# Patient Record
Sex: MALE | Race: WHITE | Employment: OTHER | URBAN - NONMETROPOLITAN AREA
[De-identification: names, ages, dates, MRNs, and addresses within clinical notes are randomized per-mention and may not be internally consistent; named-entity substitution may affect disease eponyms.]

---

## 2017-03-15 ENCOUNTER — DOCTOR'S OFFICE (OUTPATIENT)
Dept: URBAN - NONMETROPOLITAN AREA CLINIC 1 | Facility: CLINIC | Age: 67
Setting detail: OPHTHALMOLOGY
End: 2017-03-15
Payer: COMMERCIAL

## 2017-03-15 DIAGNOSIS — H35.3131: ICD-10-CM

## 2017-03-15 DIAGNOSIS — H40.003: ICD-10-CM

## 2017-03-15 DIAGNOSIS — H25.13: ICD-10-CM

## 2017-03-15 PROCEDURE — 92083 EXTENDED VISUAL FIELD XM: CPT | Performed by: OPHTHALMOLOGY

## 2017-03-15 PROCEDURE — 92134 CPTRZ OPH DX IMG PST SGM RTA: CPT | Performed by: OPHTHALMOLOGY

## 2017-03-15 PROCEDURE — 92014 COMPRE OPH EXAM EST PT 1/>: CPT | Performed by: OPHTHALMOLOGY

## 2017-03-15 PROCEDURE — 76514 ECHO EXAM OF EYE THICKNESS: CPT | Performed by: OPHTHALMOLOGY

## 2017-03-15 ASSESSMENT — LID POSITION - DERMATOCHALASIS
OD_DERMATOCHALASIS: RUL 1+
OS_DERMATOCHALASIS: LUL 1+

## 2017-03-15 ASSESSMENT — PACHYMETRY
OS_CT_CORRECTION: 0
OD_CT_CORRECTION: 0
OS_CT_UM: 544
OD_CT_UM: 545

## 2017-03-15 ASSESSMENT — REFRACTION_AUTOREFRACTION
OD_AXIS: 43
OS_SPHERE: -4.75
OS_AXIS: 78
OD_SPHERE: -3.75
OD_CYLINDER: -0.50
OS_CYLINDER: -1.00

## 2017-03-15 ASSESSMENT — CONFRONTATIONAL VISUAL FIELD TEST (CVF)
OS_FINDINGS: FULL
OD_FINDINGS: FULL

## 2017-03-15 ASSESSMENT — VISUAL ACUITY
OD_BCVA: CF 1FT
OS_BCVA: 20/25

## 2017-03-15 ASSESSMENT — REFRACTION_CURRENTRX
OS_AXIS: 180
OD_OVR_VA: 20/
OD_CYLINDER: 0.00
OD_AXIS: 180
OS_OVR_VA: 20/
OS_SPHERE: -3.75
OS_OVR_VA: 20/
OD_OVR_VA: 20/
OD_VPRISM_DIRECTION: SV
OS_OVR_VA: 20/
OD_OVR_VA: 20/
OS_VPRISM_DIRECTION: SV
OD_SPHERE: -3.75
OS_CYLINDER: 0.00

## 2017-03-15 ASSESSMENT — REFRACTION_MANIFEST
OS_VA2: 20/
OS_VA2: 20/
OS_VA1: 20/
OD_VA2: 20/
OU_VA: 20/
OS_VA1: 20/
OD_VA3: 20/
OD_VA1: 20/
OS_VA3: 20/
OS_VA3: 20/
OD_VA3: 20/
OU_VA: 20/
OD_VA1: 20/
OD_VA2: 20/
OD_VA1: 20/
OS_VA3: 20/
OU_VA: 20/
OD_VA3: 20/
OD_VA2: 20/
OS_VA2: 20/
OS_VA1: 20/

## 2017-03-15 ASSESSMENT — SPHEQUIV_DERIVED
OS_SPHEQUIV: -5.25
OD_SPHEQUIV: -4

## 2017-03-29 ENCOUNTER — RX ONLY (RX ONLY)
Age: 67
End: 2017-03-29

## 2017-03-29 ENCOUNTER — DOCTOR'S OFFICE (OUTPATIENT)
Dept: URBAN - NONMETROPOLITAN AREA CLINIC 1 | Facility: CLINIC | Age: 67
Setting detail: OPHTHALMOLOGY
End: 2017-03-29
Payer: COMMERCIAL

## 2017-03-29 DIAGNOSIS — H25.13: ICD-10-CM

## 2017-03-29 DIAGNOSIS — H40.003: ICD-10-CM

## 2017-03-29 DIAGNOSIS — H35.3131: ICD-10-CM

## 2017-03-29 PROCEDURE — 92014 COMPRE OPH EXAM EST PT 1/>: CPT | Performed by: OPHTHALMOLOGY

## 2017-03-29 PROCEDURE — 92134 CPTRZ OPH DX IMG PST SGM RTA: CPT | Performed by: OPHTHALMOLOGY

## 2017-03-29 ASSESSMENT — REFRACTION_MANIFEST
OD_VA3: 20/
OS_VA2: 20/
OD_VA2: 20/
OS_VA3: 20/
OS_VA1: 20/
OS_VA3: 20/
OD_VA1: 20/
OD_VA2: 20/
OS_VA1: 20/
OU_VA: 20/
OS_VA3: 20/
OD_VA3: 20/
OD_VA3: 20/
OU_VA: 20/
OU_VA: 20/
OS_VA2: 20/
OD_VA1: 20/
OD_VA1: 20/
OS_VA1: 20/
OD_VA2: 20/
OS_VA2: 20/

## 2017-03-29 ASSESSMENT — REFRACTION_CURRENTRX
OS_OVR_VA: 20/
OD_OVR_VA: 20/
OS_VPRISM_DIRECTION: SV
OD_OVR_VA: 20/
OD_VPRISM_DIRECTION: SV
OD_CYLINDER: 0.00
OS_OVR_VA: 20/
OD_SPHERE: -3.75
OD_OVR_VA: 20/
OS_OVR_VA: 20/
OS_SPHERE: -3.75
OD_AXIS: 180
OS_AXIS: 180
OS_CYLINDER: 0.00

## 2017-03-29 ASSESSMENT — REFRACTION_AUTOREFRACTION
OS_AXIS: 78
OS_SPHERE: -4.75
OD_SPHERE: -3.75
OD_AXIS: 43
OS_CYLINDER: -1.00
OD_CYLINDER: -0.50

## 2017-03-29 ASSESSMENT — CONFRONTATIONAL VISUAL FIELD TEST (CVF)
OD_FINDINGS: FULL
OS_FINDINGS: FULL

## 2017-03-29 ASSESSMENT — LID POSITION - DERMATOCHALASIS
OD_DERMATOCHALASIS: RUL 1+
OS_DERMATOCHALASIS: LUL 1+

## 2017-03-29 ASSESSMENT — SPHEQUIV_DERIVED
OS_SPHEQUIV: -5.25
OD_SPHEQUIV: -4

## 2017-03-29 ASSESSMENT — VISUAL ACUITY
OD_BCVA: CF 1FT
OS_BCVA: 20/20-1

## 2017-04-03 ENCOUNTER — DOCTOR'S OFFICE (OUTPATIENT)
Dept: URBAN - NONMETROPOLITAN AREA CLINIC 1 | Facility: CLINIC | Age: 67
Setting detail: OPHTHALMOLOGY
End: 2017-04-03
Payer: COMMERCIAL

## 2017-04-03 DIAGNOSIS — H25.11: ICD-10-CM

## 2017-04-03 PROCEDURE — 92136 OPHTHALMIC BIOMETRY: CPT | Performed by: OPHTHALMOLOGY

## 2017-04-20 ENCOUNTER — AMBUL SURGICAL CARE (OUTPATIENT)
Dept: URBAN - NONMETROPOLITAN AREA SURGERY 1 | Facility: SURGERY | Age: 67
Setting detail: OPHTHALMOLOGY
End: 2017-04-20
Payer: COMMERCIAL

## 2017-04-20 DIAGNOSIS — H25.11: ICD-10-CM

## 2017-04-20 DIAGNOSIS — H25.041: ICD-10-CM

## 2017-04-20 PROCEDURE — 66984 XCAPSL CTRC RMVL W/O ECP: CPT | Performed by: OPHTHALMOLOGY

## 2017-04-20 PROCEDURE — G8918 PT W/O PREOP ORDER IV AB PRO: HCPCS | Performed by: OPHTHALMOLOGY

## 2017-04-20 PROCEDURE — G8907 PT DOC NO EVENTS ON DISCHARG: HCPCS | Performed by: OPHTHALMOLOGY

## 2017-04-21 ENCOUNTER — DOCTOR'S OFFICE (OUTPATIENT)
Dept: URBAN - NONMETROPOLITAN AREA CLINIC 1 | Facility: CLINIC | Age: 67
Setting detail: OPHTHALMOLOGY
End: 2017-04-21
Payer: COMMERCIAL

## 2017-04-21 ENCOUNTER — RX ONLY (RX ONLY)
Age: 67
End: 2017-04-21

## 2017-04-21 DIAGNOSIS — H25.12: ICD-10-CM

## 2017-04-21 DIAGNOSIS — Z96.1: ICD-10-CM

## 2017-04-21 PROCEDURE — 99024 POSTOP FOLLOW-UP VISIT: CPT | Performed by: OPTOMETRIST

## 2017-04-21 PROCEDURE — CATARACT K CATARACT KIT: Performed by: OPTOMETRIST

## 2017-04-21 ASSESSMENT — REFRACTION_CURRENTRX
OD_OVR_VA: 20/
OD_SPHERE: -3.75
OS_SPHERE: -3.75
OS_OVR_VA: 20/
OS_AXIS: 180
OD_OVR_VA: 20/
OS_VPRISM_DIRECTION: SV
OD_OVR_VA: 20/
OD_VPRISM_DIRECTION: SV
OS_CYLINDER: 0.00
OS_OVR_VA: 20/
OD_CYLINDER: 0.00
OD_AXIS: 180
OS_OVR_VA: 20/

## 2017-04-21 ASSESSMENT — REFRACTION_MANIFEST
OS_VA2: 20/
OU_VA: 20/
OS_VA3: 20/
OD_VA2: 20/
OS_VA3: 20/
OS_VA2: 20/
OS_VA1: 20/
OS_VA1: 20/
OD_VA3: 20/
OD_VA1: 20/
OD_VA2: 20/
OD_VA3: 20/
OS_VA3: 20/
OD_VA3: 20/
OD_VA1: 20/
OD_VA1: 20/
OU_VA: 20/
OS_VA2: 20/
OU_VA: 20/
OS_VA1: 20/
OD_VA2: 20/

## 2017-04-21 ASSESSMENT — LID POSITION - DERMATOCHALASIS
OD_DERMATOCHALASIS: RUL 1+
OS_DERMATOCHALASIS: LUL 1+

## 2017-04-21 ASSESSMENT — REFRACTION_AUTOREFRACTION
OS_AXIS: 78
OS_CYLINDER: -1.00
OS_SPHERE: -4.75
OD_CYLINDER: -0.25
OD_SPHERE: -0.75
OD_AXIS: 054

## 2017-04-21 ASSESSMENT — SPHEQUIV_DERIVED
OS_SPHEQUIV: -5.25
OD_SPHEQUIV: -0.875

## 2017-04-21 ASSESSMENT — VISUAL ACUITY
OS_BCVA: 20/40
OD_BCVA: CF 1FT

## 2017-05-03 ENCOUNTER — DOCTOR'S OFFICE (OUTPATIENT)
Dept: URBAN - NONMETROPOLITAN AREA CLINIC 1 | Facility: CLINIC | Age: 67
Setting detail: OPHTHALMOLOGY
End: 2017-05-03
Payer: COMMERCIAL

## 2017-05-03 ENCOUNTER — RX ONLY (RX ONLY)
Age: 67
End: 2017-05-03

## 2017-05-03 DIAGNOSIS — Z96.1: ICD-10-CM

## 2017-05-03 DIAGNOSIS — H25.12: ICD-10-CM

## 2017-05-03 PROCEDURE — 99024 POSTOP FOLLOW-UP VISIT: CPT | Performed by: OPHTHALMOLOGY

## 2017-05-03 PROCEDURE — 92136 OPHTHALMIC BIOMETRY: CPT | Performed by: OPHTHALMOLOGY

## 2017-05-03 ASSESSMENT — REFRACTION_CURRENTRX
OS_OVR_VA: 20/
OS_CYLINDER: 0.00
OS_OVR_VA: 20/
OS_AXIS: 180
OD_AXIS: 180
OS_VPRISM_DIRECTION: SV
OD_OVR_VA: 20/
OS_OVR_VA: 20/
OD_CYLINDER: 0.00
OD_OVR_VA: 20/
OS_SPHERE: -3.75
OD_OVR_VA: 20/
OD_VPRISM_DIRECTION: SV
OD_SPHERE: -3.75

## 2017-05-03 ASSESSMENT — REFRACTION_MANIFEST
OS_VA2: 20/
OS_VA3: 20/
OS_VA2: 20/
OU_VA: 20/
OS_VA3: 20/
OD_VA2: 20/
OD_VA3: 20/
OS_VA1: 20/
OU_VA: 20/
OD_VA1: 20/
OD_VA3: 20/
OD_VA1: 20/
OD_VA2: 20/
OD_VA3: 20/
OS_VA3: 20/
OD_VA2: 20/
OU_VA: 20/
OS_VA2: 20/
OD_VA1: 20/

## 2017-05-03 ASSESSMENT — REFRACTION_AUTOREFRACTION
OD_CYLINDER: -0.75
OS_CYLINDER: -1.50
OS_AXIS: 80
OD_AXIS: 074
OS_SPHERE: -4.50
OD_SPHERE: -0.75

## 2017-05-03 ASSESSMENT — SPHEQUIV_DERIVED
OS_SPHEQUIV: -5.25
OD_SPHEQUIV: -1.125

## 2017-05-03 ASSESSMENT — VISUAL ACUITY
OD_BCVA: CF 1FT
OS_BCVA: 20/25

## 2017-05-08 ENCOUNTER — AMBUL SURGICAL CARE (OUTPATIENT)
Dept: URBAN - NONMETROPOLITAN AREA SURGERY 1 | Facility: SURGERY | Age: 67
Setting detail: OPHTHALMOLOGY
End: 2017-05-08
Payer: COMMERCIAL

## 2017-05-08 DIAGNOSIS — H25.12: ICD-10-CM

## 2017-05-08 DIAGNOSIS — H25.042: ICD-10-CM

## 2017-05-08 PROCEDURE — G8918 PT W/O PREOP ORDER IV AB PRO: HCPCS | Performed by: OPHTHALMOLOGY

## 2017-05-08 PROCEDURE — 66984 XCAPSL CTRC RMVL W/O ECP: CPT | Performed by: OPHTHALMOLOGY

## 2017-05-08 PROCEDURE — G8907 PT DOC NO EVENTS ON DISCHARG: HCPCS | Performed by: OPHTHALMOLOGY

## 2017-05-09 ENCOUNTER — DOCTOR'S OFFICE (OUTPATIENT)
Dept: URBAN - NONMETROPOLITAN AREA CLINIC 1 | Facility: CLINIC | Age: 67
Setting detail: OPHTHALMOLOGY
End: 2017-05-09

## 2017-05-09 ENCOUNTER — RX ONLY (RX ONLY)
Age: 67
End: 2017-05-09

## 2017-05-09 ENCOUNTER — DOCTOR'S OFFICE (OUTPATIENT)
Dept: URBAN - NONMETROPOLITAN AREA CLINIC 1 | Facility: CLINIC | Age: 67
Setting detail: OPHTHALMOLOGY
End: 2017-05-09
Payer: COMMERCIAL

## 2017-05-09 DIAGNOSIS — Z96.1: ICD-10-CM

## 2017-05-09 PROBLEM — H25.12 CATARACT NUCLEAR SCLEROSIS; LEFT EYE: Status: RESOLVED | Noted: 2017-04-21 | Resolved: 2017-05-09

## 2017-05-09 PROCEDURE — 99024 POSTOP FOLLOW-UP VISIT: CPT | Performed by: OPHTHALMOLOGY

## 2017-05-17 ASSESSMENT — SPHEQUIV_DERIVED
OD_SPHEQUIV: -0.875
OS_SPHEQUIV: -1.125

## 2017-05-17 ASSESSMENT — REFRACTION_CURRENTRX
OD_CYLINDER: 0.00
OS_AXIS: 180
OD_OVR_VA: 20/
OS_VPRISM_DIRECTION: SV
OD_AXIS: 180
OS_OVR_VA: 20/
OS_OVR_VA: 20/
OS_CYLINDER: 0.00
OD_VPRISM_DIRECTION: SV
OD_OVR_VA: 20/
OS_SPHERE: -3.75
OS_OVR_VA: 20/
OD_OVR_VA: 20/
OD_SPHERE: -3.75

## 2017-05-17 ASSESSMENT — REFRACTION_MANIFEST
OD_VA2: 20/
OD_VA1: 20/
OU_VA: 20/
OU_VA: 20/
OS_VA1: 20/
OS_VA3: 20/
OS_VA2: 20/
OS_VA2: 20/
OS_VA1: 20/
OD_VA2: 20/
OD_VA1: 20/
OD_VA1: 20/
OS_VA3: 20/
OD_VA3: 20/
OD_VA3: 20/
OS_VA2: 20/
OS_VA3: 20/
OD_VA2: 20/
OU_VA: 20/
OD_VA3: 20/
OS_VA1: 20/

## 2017-05-17 ASSESSMENT — VISUAL ACUITY
OD_BCVA: 20/400
OS_BCVA: 20/30

## 2017-05-17 ASSESSMENT — REFRACTION_AUTOREFRACTION
OD_CYLINDER: -0.75
OS_AXIS: 61
OS_CYLINDER: -1.25
OS_SPHERE: -0.50
OD_SPHERE: -0.50
OD_AXIS: 63

## 2017-05-23 ENCOUNTER — DOCTOR'S OFFICE (OUTPATIENT)
Dept: URBAN - NONMETROPOLITAN AREA CLINIC 1 | Facility: CLINIC | Age: 67
Setting detail: OPHTHALMOLOGY
End: 2017-05-23
Payer: COMMERCIAL

## 2017-05-23 DIAGNOSIS — Z96.1: ICD-10-CM

## 2017-05-23 PROCEDURE — 99024 POSTOP FOLLOW-UP VISIT: CPT | Performed by: OPHTHALMOLOGY

## 2017-05-23 ASSESSMENT — REFRACTION_MANIFEST
OD_VA1: 20/
OD_VA3: 20/
OD_VA1: 20/
OD_VA2: 20/
OS_VA2: 20/
OS_VA1: 20/
OS_VA3: 20/
OD_VA1: 20/
OS_VA1: 20/
OS_VA3: 20/
OD_VA2: 20/
OS_VA2: 20/
OS_VA3: 20/
OU_VA: 20/
OD_VA3: 20/
OU_VA: 20/
OU_VA: 20/
OD_VA2: 20/
OD_VA3: 20/
OS_VA2: 20/
OS_VA1: 20/

## 2017-05-23 ASSESSMENT — REFRACTION_CURRENTRX
OD_OVR_VA: 20/
OD_SPHERE: -3.75
OS_AXIS: 180
OD_CYLINDER: 0.00
OD_OVR_VA: 20/
OS_CYLINDER: 0.00
OS_SPHERE: -3.75
OD_AXIS: 180
OS_OVR_VA: 20/
OS_OVR_VA: 20/
OD_OVR_VA: 20/
OS_OVR_VA: 20/
OD_VPRISM_DIRECTION: SV
OS_VPRISM_DIRECTION: SV

## 2017-05-23 ASSESSMENT — CONFRONTATIONAL VISUAL FIELD TEST (CVF)
OS_FINDINGS: FULL
OD_FINDINGS: FULL

## 2017-05-23 ASSESSMENT — REFRACTION_AUTOREFRACTION
OD_AXIS: 062
OD_CYLINDER: -1.25
OS_CYLINDER: -1.50
OS_SPHERE: -0.50
OS_AXIS: 081
OD_SPHERE: -0.50

## 2017-05-23 ASSESSMENT — LID POSITION - DERMATOCHALASIS
OS_DERMATOCHALASIS: LUL 1+
OD_DERMATOCHALASIS: RUL 1+

## 2017-05-23 ASSESSMENT — SPHEQUIV_DERIVED
OS_SPHEQUIV: -1.25
OD_SPHEQUIV: -1.125

## 2017-05-23 ASSESSMENT — VISUAL ACUITY
OD_BCVA: CF 3FT
OS_BCVA: 20/50

## 2017-07-19 ENCOUNTER — RX ONLY (RX ONLY)
Age: 67
End: 2017-07-19

## 2017-07-19 ENCOUNTER — DOCTOR'S OFFICE (OUTPATIENT)
Dept: URBAN - NONMETROPOLITAN AREA CLINIC 1 | Facility: CLINIC | Age: 67
Setting detail: OPHTHALMOLOGY
End: 2017-07-19
Payer: COMMERCIAL

## 2017-07-19 DIAGNOSIS — H35.3134: ICD-10-CM

## 2017-07-19 DIAGNOSIS — H40.003: ICD-10-CM

## 2017-07-19 DIAGNOSIS — H43.811: ICD-10-CM

## 2017-07-19 PROCEDURE — 92134 CPTRZ OPH DX IMG PST SGM RTA: CPT | Performed by: OPHTHALMOLOGY

## 2017-07-19 PROCEDURE — 92014 COMPRE OPH EXAM EST PT 1/>: CPT | Performed by: OPHTHALMOLOGY

## 2017-07-19 ASSESSMENT — REFRACTION_MANIFEST
OS_VA2: 20/
OD_VA3: 20/
OD_VA1: 20/
OS_VA1: 20/
OD_VA2: 20/
OD_VA2: 20/
OD_VA1: 20/
OS_VA2: 20/
OS_VA3: 20/
OS_VA3: 20/
OD_VA1: 20/
OS_VA1: 20/
OS_VA3: 20/
OD_VA2: 20/
OU_VA: 20/
OU_VA: 20/
OD_VA3: 20/
OS_VA1: 20/
OD_VA3: 20/
OU_VA: 20/
OS_VA2: 20/

## 2017-07-19 ASSESSMENT — REFRACTION_CURRENTRX
OS_OVR_VA: 20/
OS_OVR_VA: 20/
OS_SPHERE: -3.75
OD_VPRISM_DIRECTION: SV
OS_VPRISM_DIRECTION: SV
OD_CYLINDER: 0.00
OS_AXIS: 180
OD_OVR_VA: 20/
OS_CYLINDER: 0.00
OD_AXIS: 180
OD_OVR_VA: 20/
OS_OVR_VA: 20/
OD_SPHERE: -3.75
OD_OVR_VA: 20/

## 2017-07-19 ASSESSMENT — CONFRONTATIONAL VISUAL FIELD TEST (CVF)
OS_FINDINGS: FULL
OD_FINDINGS: FULL

## 2017-07-19 ASSESSMENT — LID POSITION - DERMATOCHALASIS
OD_DERMATOCHALASIS: RUL 1+
OS_DERMATOCHALASIS: LUL 1+

## 2017-07-19 ASSESSMENT — REFRACTION_AUTOREFRACTION
OD_SPHERE: -0.50
OS_CYLINDER: -1.50
OD_CYLINDER: -1.25
OS_AXIS: 081
OS_SPHERE: -0.50
OD_AXIS: 062

## 2017-07-19 ASSESSMENT — VISUAL ACUITY
OS_BCVA: 20/25
OD_BCVA: CF 3FT

## 2017-07-19 ASSESSMENT — SPHEQUIV_DERIVED
OS_SPHEQUIV: -1.25
OD_SPHEQUIV: -1.125

## 2017-11-22 ENCOUNTER — DOCTOR'S OFFICE (OUTPATIENT)
Dept: URBAN - NONMETROPOLITAN AREA CLINIC 1 | Facility: CLINIC | Age: 67
Setting detail: OPHTHALMOLOGY
End: 2017-11-22
Payer: COMMERCIAL

## 2017-11-22 DIAGNOSIS — H35.3134: ICD-10-CM

## 2017-11-22 DIAGNOSIS — Z96.1: ICD-10-CM

## 2017-11-22 DIAGNOSIS — H43.811: ICD-10-CM

## 2017-11-22 DIAGNOSIS — H40.003: ICD-10-CM

## 2017-11-22 PROCEDURE — 92014 COMPRE OPH EXAM EST PT 1/>: CPT | Performed by: OPHTHALMOLOGY

## 2017-11-22 PROCEDURE — 92134 CPTRZ OPH DX IMG PST SGM RTA: CPT | Performed by: OPHTHALMOLOGY

## 2017-11-22 PROCEDURE — 92250 FUNDUS PHOTOGRAPHY W/I&R: CPT | Performed by: OPHTHALMOLOGY

## 2017-11-22 ASSESSMENT — VISUAL ACUITY
OS_BCVA: 20/50+2
OD_BCVA: CF 3FT

## 2017-11-22 ASSESSMENT — REFRACTION_AUTOREFRACTION
OS_SPHERE: -1.25
OD_AXIS: 101
OS_CYLINDER: -2.50
OD_CYLINDER: -1.00
OS_AXIS: 083
OD_SPHERE: -0.75

## 2017-11-22 ASSESSMENT — REFRACTION_MANIFEST
OS_VA2: 20/
OS_VA1: 20/
OD_VA1: 20/
OD_VA1: 20/
OD_VA3: 20/
OS_VA3: 20/
OS_VA2: 20/
OU_VA: 20/
OD_VA2: 20/
OS_VA1: 20/
OD_VA3: 20/
OD_VA1: 20/
OD_VA3: 20/
OD_VA2: 20/
OS_VA3: 20/
OS_VA2: 20/
OS_VA3: 20/
OU_VA: 20/
OS_VA1: 20/
OD_VA2: 20/
OU_VA: 20/

## 2017-11-22 ASSESSMENT — REFRACTION_CURRENTRX
OS_OVR_VA: 20/
OS_CYLINDER: 0.00
OS_OVR_VA: 20/
OD_VPRISM_DIRECTION: SV
OD_SPHERE: -3.75
OS_AXIS: 180
OD_CYLINDER: 0.00
OS_SPHERE: -3.75
OD_OVR_VA: 20/
OD_OVR_VA: 20/
OD_AXIS: 180
OD_OVR_VA: 20/
OS_OVR_VA: 20/
OS_VPRISM_DIRECTION: SV

## 2017-11-22 ASSESSMENT — LID POSITION - DERMATOCHALASIS
OS_DERMATOCHALASIS: LUL 1+
OD_DERMATOCHALASIS: RUL 1+

## 2017-11-22 ASSESSMENT — CONFRONTATIONAL VISUAL FIELD TEST (CVF)
OS_FINDINGS: FULL
OD_FINDINGS: FULL

## 2017-11-22 ASSESSMENT — SPHEQUIV_DERIVED
OD_SPHEQUIV: -1.25
OS_SPHEQUIV: -2.5

## 2017-12-07 ENCOUNTER — DOCTOR'S OFFICE (OUTPATIENT)
Dept: URBAN - METROPOLITAN AREA CLINIC 136 | Facility: CLINIC | Age: 67
Setting detail: OPHTHALMOLOGY
End: 2017-12-07
Payer: COMMERCIAL

## 2017-12-07 DIAGNOSIS — H43.811: ICD-10-CM

## 2017-12-07 DIAGNOSIS — H40.003: ICD-10-CM

## 2017-12-07 DIAGNOSIS — Z96.1: ICD-10-CM

## 2017-12-07 DIAGNOSIS — H35.3211: ICD-10-CM

## 2017-12-07 PROCEDURE — 67028 INJECTION EYE DRUG: CPT | Performed by: OPHTHALMOLOGY

## 2017-12-07 PROCEDURE — 92134 CPTRZ OPH DX IMG PST SGM RTA: CPT | Performed by: OPHTHALMOLOGY

## 2017-12-07 PROCEDURE — 92014 COMPRE OPH EXAM EST PT 1/>: CPT | Performed by: OPHTHALMOLOGY

## 2017-12-07 PROCEDURE — 92235 FLUORESCEIN ANGRPH MLTIFRAME: CPT | Performed by: OPHTHALMOLOGY

## 2017-12-07 ASSESSMENT — LID POSITION - DERMATOCHALASIS
OD_DERMATOCHALASIS: RUL 1+
OS_DERMATOCHALASIS: LUL 1+

## 2017-12-07 ASSESSMENT — CONFRONTATIONAL VISUAL FIELD TEST (CVF)
OS_FINDINGS: FULL
OD_FINDINGS: FULL

## 2017-12-11 ASSESSMENT — REFRACTION_CURRENTRX
OD_OVR_VA: 20/
OS_SPHERE: -3.75
OS_CYLINDER: 0.00
OD_SPHERE: -3.75
OD_AXIS: 180
OS_VPRISM_DIRECTION: SV
OD_OVR_VA: 20/
OS_OVR_VA: 20/
OD_CYLINDER: 0.00
OD_VPRISM_DIRECTION: SV
OS_AXIS: 180
OS_OVR_VA: 20/
OS_OVR_VA: 20/
OD_OVR_VA: 20/

## 2017-12-11 ASSESSMENT — REFRACTION_MANIFEST
OS_VA3: 20/
OU_VA: 20/
OD_VA3: 20/
OS_VA3: 20/
OD_VA3: 20/
OS_VA1: 20/
OS_VA2: 20/
OS_VA2: 20/
OD_VA1: 20/
OD_VA2: 20/
OU_VA: 20/
OD_VA1: 20/
OS_VA1: 20/
OS_VA3: 20/
OD_VA2: 20/
OD_VA2: 20/
OU_VA: 20/
OS_VA1: 20/
OD_VA3: 20/
OS_VA2: 20/
OD_VA1: 20/

## 2017-12-11 ASSESSMENT — REFRACTION_AUTOREFRACTION
OD_CYLINDER: -1.00
OS_SPHERE: -1.25
OD_SPHERE: -0.75
OD_AXIS: 101
OS_AXIS: 083
OS_CYLINDER: -2.50

## 2017-12-11 ASSESSMENT — VISUAL ACUITY
OS_BCVA: 20/20-1
OD_BCVA: CF 3FT

## 2017-12-11 ASSESSMENT — SPHEQUIV_DERIVED
OS_SPHEQUIV: -2.5
OD_SPHEQUIV: -1.25

## 2018-01-18 ENCOUNTER — DOCTOR'S OFFICE (OUTPATIENT)
Dept: URBAN - METROPOLITAN AREA CLINIC 136 | Facility: CLINIC | Age: 68
Setting detail: OPHTHALMOLOGY
End: 2018-01-18
Payer: COMMERCIAL

## 2018-01-18 DIAGNOSIS — Z96.1: ICD-10-CM

## 2018-01-18 DIAGNOSIS — H35.3124: ICD-10-CM

## 2018-01-18 DIAGNOSIS — H40.003: ICD-10-CM

## 2018-01-18 DIAGNOSIS — H35.3211: ICD-10-CM

## 2018-01-18 DIAGNOSIS — H43.811: ICD-10-CM

## 2018-01-18 PROCEDURE — 67028 INJECTION EYE DRUG: CPT | Performed by: OPHTHALMOLOGY

## 2018-01-18 PROCEDURE — 92014 COMPRE OPH EXAM EST PT 1/>: CPT | Performed by: OPHTHALMOLOGY

## 2018-01-18 PROCEDURE — 92134 CPTRZ OPH DX IMG PST SGM RTA: CPT | Performed by: OPHTHALMOLOGY

## 2018-01-18 ASSESSMENT — REFRACTION_MANIFEST
OS_VA2: 20/
OS_VA3: 20/
OD_VA3: 20/
OU_VA: 20/
OS_VA1: 20/
OU_VA: 20/
OU_VA: 20/
OD_VA1: 20/
OS_VA3: 20/
OS_VA2: 20/
OD_VA2: 20/
OD_VA3: 20/
OD_VA1: 20/
OD_VA1: 20/
OS_VA1: 20/
OS_VA3: 20/
OD_VA2: 20/
OS_VA2: 20/
OD_VA2: 20/
OD_VA3: 20/
OS_VA1: 20/

## 2018-01-18 ASSESSMENT — REFRACTION_CURRENTRX
OS_SPHERE: -3.75
OS_CYLINDER: 0.00
OS_OVR_VA: 20/
OD_VPRISM_DIRECTION: SV
OD_CYLINDER: 0.00
OS_VPRISM_DIRECTION: SV
OS_AXIS: 180
OD_AXIS: 180
OD_SPHERE: -3.75
OD_OVR_VA: 20/
OD_OVR_VA: 20/
OS_OVR_VA: 20/
OS_OVR_VA: 20/
OD_OVR_VA: 20/

## 2018-01-18 ASSESSMENT — VISUAL ACUITY
OS_BCVA: 20/25
OD_BCVA: CF 3FT

## 2018-01-18 ASSESSMENT — REFRACTION_AUTOREFRACTION
OD_SPHERE: -0.75
OS_AXIS: 083
OD_CYLINDER: -1.00
OS_CYLINDER: -2.50
OS_SPHERE: -1.25
OD_AXIS: 101

## 2018-01-18 ASSESSMENT — CONFRONTATIONAL VISUAL FIELD TEST (CVF)
OD_FINDINGS: FULL
OS_FINDINGS: FULL

## 2018-01-18 ASSESSMENT — SPHEQUIV_DERIVED
OD_SPHEQUIV: -1.25
OS_SPHEQUIV: -2.5

## 2018-01-18 ASSESSMENT — LID POSITION - DERMATOCHALASIS
OS_DERMATOCHALASIS: LUL 1+
OD_DERMATOCHALASIS: RUL 1+

## 2018-05-02 ENCOUNTER — DOCTOR'S OFFICE (OUTPATIENT)
Dept: URBAN - NONMETROPOLITAN AREA CLINIC 1 | Facility: CLINIC | Age: 68
Setting detail: OPHTHALMOLOGY
End: 2018-05-02
Payer: COMMERCIAL

## 2018-05-02 DIAGNOSIS — H40.003: ICD-10-CM

## 2018-05-02 DIAGNOSIS — H43.811: ICD-10-CM

## 2018-05-02 DIAGNOSIS — H35.3124: ICD-10-CM

## 2018-05-02 DIAGNOSIS — H35.3211: ICD-10-CM

## 2018-05-02 DIAGNOSIS — Z96.1: ICD-10-CM

## 2018-05-02 DIAGNOSIS — H26.493: ICD-10-CM

## 2018-05-02 PROCEDURE — 92083 EXTENDED VISUAL FIELD XM: CPT | Performed by: OPHTHALMOLOGY

## 2018-05-02 PROCEDURE — 92012 INTRM OPH EXAM EST PATIENT: CPT | Performed by: OPHTHALMOLOGY

## 2018-05-02 PROCEDURE — 92134 CPTRZ OPH DX IMG PST SGM RTA: CPT | Performed by: OPHTHALMOLOGY

## 2018-05-02 PROCEDURE — 76514 ECHO EXAM OF EYE THICKNESS: CPT | Performed by: OPHTHALMOLOGY

## 2018-05-02 ASSESSMENT — REFRACTION_MANIFEST
OS_VA3: 20/
OS_VA1: 20/
OD_VA2: 20/
OS_VA2: 20/
OD_VA3: 20/
OS_VA2: 20/
OD_VA1: 20/
OD_VA3: 20/
OS_VA2: 20/
OU_VA: 20/
OD_VA1: 20/
OD_VA3: 20/
OS_VA3: 20/
OD_VA2: 20/
OD_VA2: 20/
OS_VA3: 20/
OU_VA: 20/
OS_VA1: 20/
OD_VA1: 20/
OU_VA: 20/
OS_VA1: 20/

## 2018-05-02 ASSESSMENT — VISUAL ACUITY
OS_BCVA: 20/50+2
OD_BCVA: CF 3FT

## 2018-05-02 ASSESSMENT — REFRACTION_CURRENTRX
OS_AXIS: 180
OS_VPRISM_DIRECTION: SV
OD_OVR_VA: 20/
OS_CYLINDER: 0.00
OD_OVR_VA: 20/
OD_OVR_VA: 20/
OD_VPRISM_DIRECTION: SV
OS_OVR_VA: 20/
OS_OVR_VA: 20/
OD_CYLINDER: 0.00
OD_AXIS: 180
OS_OVR_VA: 20/
OS_SPHERE: -3.75
OD_SPHERE: -3.75

## 2018-05-02 ASSESSMENT — CONFRONTATIONAL VISUAL FIELD TEST (CVF)
OD_FINDINGS: FULL
OS_FINDINGS: FULL

## 2018-05-02 ASSESSMENT — REFRACTION_AUTOREFRACTION
OD_SPHERE: -0.50
OS_CYLINDER: -0.75
OS_AXIS: 98
OD_CYLINDER: -0.75
OD_AXIS: 82
OS_SPHERE: -1.25

## 2018-05-02 ASSESSMENT — LID POSITION - DERMATOCHALASIS
OS_DERMATOCHALASIS: LUL 1+
OD_DERMATOCHALASIS: RUL 1+

## 2018-05-02 ASSESSMENT — SPHEQUIV_DERIVED
OD_SPHEQUIV: -0.875
OS_SPHEQUIV: -1.625

## 2018-05-02 ASSESSMENT — PACHYMETRY
OD_CT_UM: 597
OD_CT_CORRECTION: -4
OS_CT_UM: 598
OS_CT_CORRECTION: -4

## 2018-05-22 ENCOUNTER — AMBUL SURGICAL CARE (OUTPATIENT)
Dept: URBAN - NONMETROPOLITAN AREA SURGERY 1 | Facility: SURGERY | Age: 68
Setting detail: OPHTHALMOLOGY
End: 2018-05-22
Payer: COMMERCIAL

## 2018-05-22 DIAGNOSIS — H26.491: ICD-10-CM

## 2018-05-22 PROCEDURE — 66821 AFTER CATARACT LASER SURGERY: CPT | Performed by: OPHTHALMOLOGY

## 2018-05-22 PROCEDURE — G8918 PT W/O PREOP ORDER IV AB PRO: HCPCS | Performed by: OPHTHALMOLOGY

## 2018-05-22 PROCEDURE — G8907 PT DOC NO EVENTS ON DISCHARG: HCPCS | Performed by: OPHTHALMOLOGY

## 2018-05-29 ENCOUNTER — DOCTOR'S OFFICE (OUTPATIENT)
Dept: URBAN - METROPOLITAN AREA CLINIC 136 | Facility: CLINIC | Age: 68
Setting detail: OPHTHALMOLOGY
End: 2018-05-29
Payer: COMMERCIAL

## 2018-05-29 ENCOUNTER — RX ONLY (RX ONLY)
Age: 68
End: 2018-05-29

## 2018-05-29 DIAGNOSIS — Z96.1: ICD-10-CM

## 2018-05-29 DIAGNOSIS — H26.493: ICD-10-CM

## 2018-05-29 DIAGNOSIS — H43.811: ICD-10-CM

## 2018-05-29 DIAGNOSIS — H40.003: ICD-10-CM

## 2018-05-29 DIAGNOSIS — H35.3124: ICD-10-CM

## 2018-05-29 DIAGNOSIS — H35.3211: ICD-10-CM

## 2018-05-29 DIAGNOSIS — H35.379: ICD-10-CM

## 2018-05-29 PROCEDURE — 92134 CPTRZ OPH DX IMG PST SGM RTA: CPT | Performed by: OPHTHALMOLOGY

## 2018-05-29 PROCEDURE — 92014 COMPRE OPH EXAM EST PT 1/>: CPT | Performed by: OPHTHALMOLOGY

## 2018-05-29 PROCEDURE — 67028 INJECTION EYE DRUG: CPT | Performed by: OPHTHALMOLOGY

## 2018-05-29 ASSESSMENT — REFRACTION_MANIFEST
OS_VA2: 20/
OD_VA3: 20/
OS_VA3: 20/
OS_VA3: 20/
OD_VA2: 20/
OD_VA1: 20/
OU_VA: 20/
OS_VA2: 20/
OS_VA1: 20/
OS_VA2: 20/
OD_VA3: 20/
OS_VA1: 20/
OS_VA1: 20/
OD_VA3: 20/
OU_VA: 20/
OD_VA1: 20/
OD_VA2: 20/
OU_VA: 20/
OD_VA1: 20/
OD_VA2: 20/
OS_VA3: 20/

## 2018-05-29 ASSESSMENT — REFRACTION_CURRENTRX
OD_AXIS: 180
OD_OVR_VA: 20/
OS_VPRISM_DIRECTION: SV
OD_OVR_VA: 20/
OS_OVR_VA: 20/
OD_CYLINDER: 0.00
OS_OVR_VA: 20/
OS_AXIS: 180
OS_CYLINDER: 0.00
OD_OVR_VA: 20/
OD_VPRISM_DIRECTION: SV
OS_SPHERE: -3.75
OD_SPHERE: -3.75
OS_OVR_VA: 20/

## 2018-05-29 ASSESSMENT — REFRACTION_AUTOREFRACTION
OD_SPHERE: -0.50
OD_AXIS: 82
OD_CYLINDER: -0.75
OS_SPHERE: -1.25
OS_CYLINDER: -0.75
OS_AXIS: 98

## 2018-05-29 ASSESSMENT — VISUAL ACUITY
OD_BCVA: CF 3FT
OS_BCVA: 20/25-1

## 2018-05-29 ASSESSMENT — CONFRONTATIONAL VISUAL FIELD TEST (CVF)
OS_FINDINGS: FULL
OD_FINDINGS: FULL

## 2018-05-29 ASSESSMENT — SPHEQUIV_DERIVED
OS_SPHEQUIV: -1.625
OD_SPHEQUIV: -0.875

## 2018-05-29 ASSESSMENT — LID POSITION - DERMATOCHALASIS
OS_DERMATOCHALASIS: LUL 1+
OD_DERMATOCHALASIS: RUL 1+

## 2018-06-12 ENCOUNTER — AMBUL SURGICAL CARE (OUTPATIENT)
Dept: URBAN - NONMETROPOLITAN AREA SURGERY 1 | Facility: SURGERY | Age: 68
Setting detail: OPHTHALMOLOGY
End: 2018-06-12
Payer: COMMERCIAL

## 2018-06-12 ENCOUNTER — DOCTOR'S OFFICE (OUTPATIENT)
Dept: URBAN - NONMETROPOLITAN AREA CLINIC 1 | Facility: CLINIC | Age: 68
Setting detail: OPHTHALMOLOGY
End: 2018-06-12
Payer: COMMERCIAL

## 2018-06-12 DIAGNOSIS — H26.492: ICD-10-CM

## 2018-06-12 PROCEDURE — G8918 PT W/O PREOP ORDER IV AB PRO: HCPCS | Performed by: OPHTHALMOLOGY

## 2018-06-12 PROCEDURE — 66821 AFTER CATARACT LASER SURGERY: CPT | Performed by: OPHTHALMOLOGY

## 2018-06-12 PROCEDURE — G8907 PT DOC NO EVENTS ON DISCHARG: HCPCS | Performed by: OPHTHALMOLOGY

## 2018-06-12 PROCEDURE — 99024 POSTOP FOLLOW-UP VISIT: CPT | Performed by: OPHTHALMOLOGY

## 2018-06-12 ASSESSMENT — REFRACTION_CURRENTRX
OS_VPRISM_DIRECTION: SV
OD_CYLINDER: 0.00
OD_VPRISM_DIRECTION: SV
OS_OVR_VA: 20/
OD_SPHERE: -3.75
OD_AXIS: 180
OD_OVR_VA: 20/
OS_OVR_VA: 20/
OD_OVR_VA: 20/
OS_OVR_VA: 20/
OS_SPHERE: -3.75
OD_OVR_VA: 20/
OS_AXIS: 180
OS_CYLINDER: 0.00

## 2018-06-12 ASSESSMENT — REFRACTION_MANIFEST
OD_VA3: 20/
OD_VA1: 20/
OS_VA3: 20/
OD_VA2: 20/
OU_VA: 20/
OU_VA: 20/
OS_VA2: 20/
OS_VA1: 20/
OD_VA2: 20/
OU_VA: 20/
OD_VA3: 20/
OS_VA1: 20/
OD_VA2: 20/
OD_VA3: 20/
OS_VA1: 20/
OS_VA3: 20/
OS_VA3: 20/
OD_VA1: 20/
OS_VA2: 20/
OD_VA1: 20/
OS_VA2: 20/

## 2018-06-12 ASSESSMENT — VISUAL ACUITY
OD_BCVA: CF 3FT
OS_BCVA: 20/25-1

## 2018-06-12 ASSESSMENT — REFRACTION_AUTOREFRACTION
OD_SPHERE: -0.50
OS_CYLINDER: -0.75
OS_SPHERE: -1.25
OD_AXIS: 82
OS_AXIS: 98
OD_CYLINDER: -0.75

## 2018-06-12 ASSESSMENT — SPHEQUIV_DERIVED
OS_SPHEQUIV: -1.625
OD_SPHEQUIV: -0.875

## 2018-06-27 ENCOUNTER — DOCTOR'S OFFICE (OUTPATIENT)
Dept: URBAN - METROPOLITAN AREA CLINIC 136 | Facility: CLINIC | Age: 68
Setting detail: OPHTHALMOLOGY
End: 2018-06-27
Payer: COMMERCIAL

## 2018-06-27 ENCOUNTER — RX ONLY (RX ONLY)
Age: 68
End: 2018-06-27

## 2018-06-27 DIAGNOSIS — H35.3211: ICD-10-CM

## 2018-06-27 DIAGNOSIS — H40.003: ICD-10-CM

## 2018-06-27 DIAGNOSIS — H35.379: ICD-10-CM

## 2018-06-27 DIAGNOSIS — H43.811: ICD-10-CM

## 2018-06-27 DIAGNOSIS — Z96.1: ICD-10-CM

## 2018-06-27 DIAGNOSIS — H35.3124: ICD-10-CM

## 2018-06-27 PROCEDURE — 92012 INTRM OPH EXAM EST PATIENT: CPT | Performed by: OPHTHALMOLOGY

## 2018-06-27 PROCEDURE — 67028 INJECTION EYE DRUG: CPT | Performed by: OPHTHALMOLOGY

## 2018-06-27 PROCEDURE — 92134 CPTRZ OPH DX IMG PST SGM RTA: CPT | Performed by: OPHTHALMOLOGY

## 2018-06-27 ASSESSMENT — REFRACTION_AUTOREFRACTION
OD_SPHERE: -0.50
OS_SPHERE: -1.25
OD_AXIS: 82
OS_CYLINDER: -0.75
OS_AXIS: 98
OD_CYLINDER: -0.75

## 2018-06-27 ASSESSMENT — REFRACTION_MANIFEST
OU_VA: 20/
OD_VA3: 20/
OD_VA1: 20/
OU_VA: 20/
OD_VA1: 20/
OD_VA2: 20/
OS_VA3: 20/
OS_VA2: 20/
OS_VA1: 20/
OS_VA2: 20/
OD_VA1: 20/
OS_VA1: 20/
OD_VA3: 20/
OS_VA3: 20/
OS_VA1: 20/
OS_VA3: 20/
OD_VA3: 20/
OS_VA2: 20/
OU_VA: 20/
OD_VA2: 20/
OD_VA2: 20/

## 2018-06-27 ASSESSMENT — LID POSITION - DERMATOCHALASIS
OD_DERMATOCHALASIS: RUL 1+
OS_DERMATOCHALASIS: LUL 1+

## 2018-06-27 ASSESSMENT — SPHEQUIV_DERIVED
OD_SPHEQUIV: -0.875
OS_SPHEQUIV: -1.625

## 2018-06-27 ASSESSMENT — CONFRONTATIONAL VISUAL FIELD TEST (CVF)
OS_FINDINGS: FULL
OD_FINDINGS: FULL

## 2018-06-27 ASSESSMENT — REFRACTION_CURRENTRX
OD_VPRISM_DIRECTION: SV
OD_SPHERE: -3.75
OS_CYLINDER: 0.00
OD_AXIS: 180
OS_AXIS: 180
OS_SPHERE: -3.75
OD_OVR_VA: 20/
OS_OVR_VA: 20/
OS_VPRISM_DIRECTION: SV
OD_OVR_VA: 20/
OS_OVR_VA: 20/
OD_OVR_VA: 20/
OS_OVR_VA: 20/
OD_CYLINDER: 0.00

## 2018-06-27 ASSESSMENT — VISUAL ACUITY
OS_BCVA: 20/25-2
OD_BCVA: CF 3FT

## 2018-08-06 ENCOUNTER — RX ONLY (RX ONLY)
Age: 68
End: 2018-08-06

## 2018-08-06 ENCOUNTER — DOCTOR'S OFFICE (OUTPATIENT)
Dept: URBAN - METROPOLITAN AREA CLINIC 136 | Facility: CLINIC | Age: 68
Setting detail: OPHTHALMOLOGY
End: 2018-08-06
Payer: COMMERCIAL

## 2018-08-06 DIAGNOSIS — H40.013: ICD-10-CM

## 2018-08-06 DIAGNOSIS — H43.811: ICD-10-CM

## 2018-08-06 DIAGNOSIS — H35.379: ICD-10-CM

## 2018-08-06 DIAGNOSIS — H35.3124: ICD-10-CM

## 2018-08-06 DIAGNOSIS — H35.3211: ICD-10-CM

## 2018-08-06 DIAGNOSIS — Z96.1: ICD-10-CM

## 2018-08-06 PROCEDURE — 92012 INTRM OPH EXAM EST PATIENT: CPT | Performed by: OPHTHALMOLOGY

## 2018-08-06 PROCEDURE — 92134 CPTRZ OPH DX IMG PST SGM RTA: CPT | Performed by: OPHTHALMOLOGY

## 2018-08-06 PROCEDURE — 67028 INJECTION EYE DRUG: CPT | Performed by: OPHTHALMOLOGY

## 2018-08-06 ASSESSMENT — REFRACTION_MANIFEST
OS_VA2: 20/
OS_VA2: 20/
OD_VA1: 20/
OS_VA1: 20/
OS_VA2: 20/
OD_VA2: 20/
OS_VA3: 20/
OD_VA3: 20/
OD_VA2: 20/
OD_VA3: 20/
OS_VA1: 20/
OD_VA1: 20/
OD_VA1: 20/
OS_VA1: 20/
OU_VA: 20/
OD_VA3: 20/
OS_VA3: 20/
OU_VA: 20/
OD_VA2: 20/
OS_VA3: 20/
OU_VA: 20/

## 2018-08-06 ASSESSMENT — CONFRONTATIONAL VISUAL FIELD TEST (CVF)
OD_FINDINGS: FULL
OS_FINDINGS: FULL

## 2018-08-06 ASSESSMENT — REFRACTION_CURRENTRX
OD_OVR_VA: 20/
OD_SPHERE: -3.75
OS_CYLINDER: 0.00
OD_OVR_VA: 20/
OS_OVR_VA: 20/
OD_CYLINDER: 0.00
OD_VPRISM_DIRECTION: SV
OS_AXIS: 180
OS_OVR_VA: 20/
OS_OVR_VA: 20/
OD_AXIS: 180
OS_SPHERE: -3.75
OS_VPRISM_DIRECTION: SV
OD_OVR_VA: 20/

## 2018-08-06 ASSESSMENT — SPHEQUIV_DERIVED
OD_SPHEQUIV: -0.875
OS_SPHEQUIV: -1.625

## 2018-08-06 ASSESSMENT — REFRACTION_AUTOREFRACTION
OD_SPHERE: -0.50
OD_CYLINDER: -0.75
OS_AXIS: 98
OD_AXIS: 82
OS_SPHERE: -1.25
OS_CYLINDER: -0.75

## 2018-08-06 ASSESSMENT — VISUAL ACUITY
OS_BCVA: 20/25-
OD_BCVA: CF 3FT

## 2018-08-06 ASSESSMENT — LID POSITION - DERMATOCHALASIS
OS_DERMATOCHALASIS: LUL 1+
OD_DERMATOCHALASIS: RUL 1+

## 2018-09-11 ENCOUNTER — DOCTOR'S OFFICE (OUTPATIENT)
Dept: URBAN - METROPOLITAN AREA CLINIC 136 | Facility: CLINIC | Age: 68
Setting detail: OPHTHALMOLOGY
End: 2018-09-11
Payer: COMMERCIAL

## 2018-09-11 DIAGNOSIS — H35.379: ICD-10-CM

## 2018-09-11 DIAGNOSIS — H35.3124: ICD-10-CM

## 2018-09-11 DIAGNOSIS — H35.3211: ICD-10-CM

## 2018-09-11 DIAGNOSIS — H43.811: ICD-10-CM

## 2018-09-11 DIAGNOSIS — H40.013: ICD-10-CM

## 2018-09-11 PROCEDURE — 92014 COMPRE OPH EXAM EST PT 1/>: CPT | Performed by: OPHTHALMOLOGY

## 2018-09-11 PROCEDURE — 92134 CPTRZ OPH DX IMG PST SGM RTA: CPT | Performed by: OPHTHALMOLOGY

## 2018-09-11 PROCEDURE — 67028 INJECTION EYE DRUG: CPT | Performed by: OPHTHALMOLOGY

## 2018-09-11 ASSESSMENT — REFRACTION_MANIFEST
OS_VA3: 20/
OD_VA3: 20/
OD_VA3: 20/
OS_VA2: 20/
OD_VA2: 20/
OD_VA2: 20/
OU_VA: 20/
OS_VA1: 20/
OS_VA3: 20/
OD_VA1: 20/
OD_VA1: 20/
OU_VA: 20/
OS_VA2: 20/
OS_VA1: 20/

## 2018-09-11 ASSESSMENT — SPHEQUIV_DERIVED
OD_SPHEQUIV: -0.875
OS_SPHEQUIV: -1.625

## 2018-09-11 ASSESSMENT — REFRACTION_CURRENTRX
OS_AXIS: 180
OD_CYLINDER: 0.00
OD_OVR_VA: 20/
OS_OVR_VA: 20/
OD_AXIS: 180
OD_SPHERE: -3.75
OS_OVR_VA: 20/
OD_OVR_VA: 20/
OD_VPRISM_DIRECTION: SV
OD_OVR_VA: 20/
OS_VPRISM_DIRECTION: SV
OS_CYLINDER: 0.00
OS_OVR_VA: 20/
OS_SPHERE: -3.75

## 2018-09-11 ASSESSMENT — REFRACTION_AUTOREFRACTION
OS_AXIS: 98
OS_SPHERE: -1.25
OD_CYLINDER: -0.75
OD_SPHERE: -0.50
OS_CYLINDER: -0.75
OD_AXIS: 82

## 2018-09-11 ASSESSMENT — CONFRONTATIONAL VISUAL FIELD TEST (CVF)
OS_FINDINGS: FULL
OD_FINDINGS: FULL

## 2018-09-11 ASSESSMENT — LID POSITION - DERMATOCHALASIS
OS_DERMATOCHALASIS: LUL 1+
OD_DERMATOCHALASIS: RUL 1+

## 2018-09-11 ASSESSMENT — VISUAL ACUITY
OD_BCVA: CF 3FT
OS_BCVA: 20/25-2

## 2018-10-17 ENCOUNTER — DOCTOR'S OFFICE (OUTPATIENT)
Dept: URBAN - METROPOLITAN AREA CLINIC 136 | Facility: CLINIC | Age: 68
Setting detail: OPHTHALMOLOGY
End: 2018-10-17
Payer: COMMERCIAL

## 2018-10-17 DIAGNOSIS — H35.3124: ICD-10-CM

## 2018-10-17 DIAGNOSIS — H43.811: ICD-10-CM

## 2018-10-17 DIAGNOSIS — H35.379: ICD-10-CM

## 2018-10-17 DIAGNOSIS — H35.3211: ICD-10-CM

## 2018-10-17 DIAGNOSIS — H40.013: ICD-10-CM

## 2018-10-17 PROCEDURE — 92012 INTRM OPH EXAM EST PATIENT: CPT | Performed by: OPHTHALMOLOGY

## 2018-10-17 PROCEDURE — 67028 INJECTION EYE DRUG: CPT | Performed by: OPHTHALMOLOGY

## 2018-10-17 PROCEDURE — 92134 CPTRZ OPH DX IMG PST SGM RTA: CPT | Performed by: OPHTHALMOLOGY

## 2018-10-17 ASSESSMENT — LID POSITION - DERMATOCHALASIS
OS_DERMATOCHALASIS: LUL 1+
OD_DERMATOCHALASIS: RUL 1+

## 2018-10-17 ASSESSMENT — CONFRONTATIONAL VISUAL FIELD TEST (CVF)
OD_FINDINGS: FULL
OS_FINDINGS: FULL

## 2018-10-19 ENCOUNTER — RX ONLY (RX ONLY)
Age: 68
End: 2018-10-19

## 2018-10-19 ASSESSMENT — VISUAL ACUITY
OD_BCVA: CF 3FT
OS_BCVA: 20/25-2

## 2018-10-19 ASSESSMENT — REFRACTION_CURRENTRX
OD_CYLINDER: 0.00
OS_OVR_VA: 20/
OS_SPHERE: -3.75
OS_CYLINDER: 0.00
OD_AXIS: 180
OS_OVR_VA: 20/
OD_OVR_VA: 20/
OD_SPHERE: -3.75
OD_OVR_VA: 20/
OS_VPRISM_DIRECTION: SV
OS_OVR_VA: 20/
OD_OVR_VA: 20/
OD_VPRISM_DIRECTION: SV
OS_AXIS: 180

## 2018-10-19 ASSESSMENT — REFRACTION_MANIFEST
OD_VA2: 20/
OS_VA1: 20/
OS_VA2: 20/
OD_VA2: 20/
OU_VA: 20/
OD_VA3: 20/
OU_VA: 20/
OD_VA1: 20/
OS_VA2: 20/
OD_VA1: 20/
OS_VA1: 20/
OD_VA3: 20/
OS_VA3: 20/
OS_VA3: 20/

## 2018-10-19 ASSESSMENT — REFRACTION_AUTOREFRACTION
OD_CYLINDER: -0.75
OD_SPHERE: -0.50
OS_CYLINDER: -0.75
OS_SPHERE: -1.25
OS_AXIS: 98
OD_AXIS: 82

## 2018-10-19 ASSESSMENT — SPHEQUIV_DERIVED
OD_SPHEQUIV: -0.875
OS_SPHEQUIV: -1.625

## 2018-11-29 ENCOUNTER — RX ONLY (RX ONLY)
Age: 68
End: 2018-11-29

## 2018-11-29 ENCOUNTER — DOCTOR'S OFFICE (OUTPATIENT)
Dept: URBAN - METROPOLITAN AREA CLINIC 136 | Facility: CLINIC | Age: 68
Setting detail: OPHTHALMOLOGY
End: 2018-11-29
Payer: COMMERCIAL

## 2018-11-29 DIAGNOSIS — H40.013: ICD-10-CM

## 2018-11-29 DIAGNOSIS — H43.811: ICD-10-CM

## 2018-11-29 DIAGNOSIS — H35.3211: ICD-10-CM

## 2018-11-29 DIAGNOSIS — Z96.1: ICD-10-CM

## 2018-11-29 DIAGNOSIS — H35.379: ICD-10-CM

## 2018-11-29 PROCEDURE — 92134 CPTRZ OPH DX IMG PST SGM RTA: CPT | Performed by: OPHTHALMOLOGY

## 2018-11-29 PROCEDURE — 67028 INJECTION EYE DRUG: CPT | Performed by: OPHTHALMOLOGY

## 2018-11-29 PROCEDURE — 92014 COMPRE OPH EXAM EST PT 1/>: CPT | Performed by: OPHTHALMOLOGY

## 2018-11-29 ASSESSMENT — REFRACTION_CURRENTRX
OD_VPRISM_DIRECTION: SV
OD_CYLINDER: 0.00
OD_AXIS: 180
OS_CYLINDER: 0.00
OS_SPHERE: -3.75
OD_OVR_VA: 20/
OS_OVR_VA: 20/
OD_OVR_VA: 20/
OS_OVR_VA: 20/
OS_AXIS: 180
OD_OVR_VA: 20/
OS_VPRISM_DIRECTION: SV
OS_OVR_VA: 20/
OD_SPHERE: -3.75

## 2018-11-29 ASSESSMENT — REFRACTION_MANIFEST
OS_VA1: 20/
OS_VA2: 20/
OS_VA3: 20/
OD_VA2: 20/
OD_VA1: 20/
OU_VA: 20/
OS_VA1: 20/
OD_VA3: 20/
OD_VA1: 20/
OD_VA2: 20/
OD_VA3: 20/
OU_VA: 20/
OS_VA2: 20/
OS_VA3: 20/

## 2018-11-29 ASSESSMENT — VISUAL ACUITY
OD_BCVA: CF 2FT
OS_BCVA: 20/40-2

## 2018-11-29 ASSESSMENT — REFRACTION_AUTOREFRACTION
OS_AXIS: 98
OS_CYLINDER: -0.75
OD_CYLINDER: -0.75
OS_SPHERE: -1.25
OD_AXIS: 82
OD_SPHERE: -0.50

## 2018-11-29 ASSESSMENT — SPHEQUIV_DERIVED
OS_SPHEQUIV: -1.625
OD_SPHEQUIV: -0.875

## 2018-11-29 ASSESSMENT — LID POSITION - DERMATOCHALASIS
OD_DERMATOCHALASIS: RUL 1+
OS_DERMATOCHALASIS: LUL 1+

## 2018-11-29 ASSESSMENT — CONFRONTATIONAL VISUAL FIELD TEST (CVF)
OS_FINDINGS: FULL
OD_FINDINGS: FULL

## 2018-12-05 ENCOUNTER — DOCTOR'S OFFICE (OUTPATIENT)
Dept: URBAN - NONMETROPOLITAN AREA CLINIC 1 | Facility: CLINIC | Age: 68
Setting detail: OPHTHALMOLOGY
End: 2018-12-05
Payer: COMMERCIAL

## 2018-12-05 DIAGNOSIS — H35.3211: ICD-10-CM

## 2018-12-05 DIAGNOSIS — H43.811: ICD-10-CM

## 2018-12-05 DIAGNOSIS — Z96.1: ICD-10-CM

## 2018-12-05 DIAGNOSIS — H35.3124: ICD-10-CM

## 2018-12-05 DIAGNOSIS — H35.379: ICD-10-CM

## 2018-12-05 DIAGNOSIS — H40.013: ICD-10-CM

## 2018-12-05 PROBLEM — H26.493 POSTERIOR CAPSULE OPACIFIED; BOTH EYES: Status: RESOLVED | Noted: 2018-06-27 | Resolved: 2018-12-05

## 2018-12-05 PROCEDURE — 92012 INTRM OPH EXAM EST PATIENT: CPT | Performed by: OPHTHALMOLOGY

## 2018-12-05 PROCEDURE — 92083 EXTENDED VISUAL FIELD XM: CPT | Performed by: OPHTHALMOLOGY

## 2018-12-05 ASSESSMENT — LID POSITION - DERMATOCHALASIS
OS_DERMATOCHALASIS: LUL 1+
OD_DERMATOCHALASIS: RUL 1+

## 2018-12-05 ASSESSMENT — REFRACTION_CURRENTRX
OS_VPRISM_DIRECTION: SV
OS_AXIS: 180
OD_VPRISM_DIRECTION: SV
OS_OVR_VA: 20/
OS_OVR_VA: 20/
OS_CYLINDER: 0.00
OS_OVR_VA: 20/
OS_SPHERE: -3.75
OD_AXIS: 180
OD_SPHERE: -3.75
OD_OVR_VA: 20/
OD_CYLINDER: 0.00

## 2018-12-05 ASSESSMENT — REFRACTION_MANIFEST
OS_VA2: 20/
OU_VA: 20/
OD_VA2: 20/
OD_VA1: 20/
OS_VA1: 20/
OS_VA3: 20/
OS_VA2: 20/
OD_VA1: 20/
OD_VA2: 20/
OD_VA3: 20/
OD_VA3: 20/
OS_VA1: 20/
OS_VA3: 20/
OU_VA: 20/

## 2018-12-05 ASSESSMENT — REFRACTION_AUTOREFRACTION
OD_AXIS: 069
OS_AXIS: 085
OS_CYLINDER: -1.00
OD_CYLINDER: -1.25
OD_SPHERE: -0.25
OS_SPHERE: -0.75

## 2018-12-05 ASSESSMENT — SPHEQUIV_DERIVED
OS_SPHEQUIV: -1.25
OD_SPHEQUIV: -0.875

## 2018-12-05 ASSESSMENT — VISUAL ACUITY
OD_BCVA: CF 2FT
OS_BCVA: 20/40-2

## 2018-12-27 ENCOUNTER — DOCTOR'S OFFICE (OUTPATIENT)
Dept: URBAN - METROPOLITAN AREA CLINIC 136 | Facility: CLINIC | Age: 68
Setting detail: OPHTHALMOLOGY
End: 2018-12-27
Payer: COMMERCIAL

## 2018-12-27 DIAGNOSIS — H35.3124: ICD-10-CM

## 2018-12-27 DIAGNOSIS — H35.379: ICD-10-CM

## 2018-12-27 DIAGNOSIS — H35.3211: ICD-10-CM

## 2018-12-27 DIAGNOSIS — H43.811: ICD-10-CM

## 2018-12-27 PROCEDURE — 92012 INTRM OPH EXAM EST PATIENT: CPT | Performed by: OPHTHALMOLOGY

## 2018-12-27 PROCEDURE — 67028 INJECTION EYE DRUG: CPT | Performed by: OPHTHALMOLOGY

## 2018-12-27 PROCEDURE — 92134 CPTRZ OPH DX IMG PST SGM RTA: CPT | Performed by: OPHTHALMOLOGY

## 2018-12-27 ASSESSMENT — LID POSITION - DERMATOCHALASIS
OS_DERMATOCHALASIS: LUL 1+
OD_DERMATOCHALASIS: RUL 1+

## 2018-12-27 ASSESSMENT — CONFRONTATIONAL VISUAL FIELD TEST (CVF)
OD_FINDINGS: FULL
OS_FINDINGS: FULL

## 2018-12-29 ENCOUNTER — RX ONLY (RX ONLY)
Age: 68
End: 2018-12-29

## 2018-12-29 ASSESSMENT — REFRACTION_MANIFEST
OU_VA: 20/
OD_VA3: 20/
OD_VA3: 20/
OS_VA3: 20/
OS_VA2: 20/
OD_VA1: 20/
OD_VA1: 20/
OD_VA2: 20/
OS_VA1: 20/
OS_VA2: 20/
OU_VA: 20/
OD_VA2: 20/
OS_VA1: 20/
OS_VA3: 20/

## 2018-12-29 ASSESSMENT — SPHEQUIV_DERIVED
OD_SPHEQUIV: -0.875
OS_SPHEQUIV: -1.25

## 2018-12-29 ASSESSMENT — REFRACTION_CURRENTRX
OD_VPRISM_DIRECTION: SV
OD_OVR_VA: 20/
OD_SPHERE: -3.75
OS_OVR_VA: 20/
OS_CYLINDER: 0.00
OD_AXIS: 180
OS_SPHERE: -3.75
OD_OVR_VA: 20/
OS_AXIS: 180
OS_OVR_VA: 20/
OD_OVR_VA: 20/
OD_CYLINDER: 0.00
OS_OVR_VA: 20/
OS_VPRISM_DIRECTION: SV

## 2018-12-29 ASSESSMENT — REFRACTION_AUTOREFRACTION
OS_SPHERE: -0.75
OD_CYLINDER: -1.25
OD_SPHERE: -0.25
OS_CYLINDER: -1.00
OD_AXIS: 069
OS_AXIS: 085

## 2018-12-29 ASSESSMENT — VISUAL ACUITY
OD_BCVA: CF 2FT
OS_BCVA: 20/25-1

## 2019-01-31 ENCOUNTER — DOCTOR'S OFFICE (OUTPATIENT)
Dept: URBAN - METROPOLITAN AREA CLINIC 136 | Facility: CLINIC | Age: 69
Setting detail: OPHTHALMOLOGY
End: 2019-01-31
Payer: COMMERCIAL

## 2019-01-31 DIAGNOSIS — H35.373: ICD-10-CM

## 2019-01-31 DIAGNOSIS — H35.3124: ICD-10-CM

## 2019-01-31 DIAGNOSIS — H43.811: ICD-10-CM

## 2019-01-31 DIAGNOSIS — H35.3211: ICD-10-CM

## 2019-01-31 PROCEDURE — 92134 CPTRZ OPH DX IMG PST SGM RTA: CPT | Performed by: OPHTHALMOLOGY

## 2019-01-31 PROCEDURE — 67028 INJECTION EYE DRUG: CPT | Performed by: OPHTHALMOLOGY

## 2019-01-31 PROCEDURE — 92014 COMPRE OPH EXAM EST PT 1/>: CPT | Performed by: OPHTHALMOLOGY

## 2019-01-31 ASSESSMENT — REFRACTION_CURRENTRX
OS_OVR_VA: 20/
OS_OVR_VA: 20/
OD_AXIS: 180
OS_OVR_VA: 20/
OD_CYLINDER: 0.00
OS_CYLINDER: 0.00
OD_OVR_VA: 20/
OD_OVR_VA: 20/
OS_SPHERE: -3.75
OD_OVR_VA: 20/
OS_AXIS: 180
OD_SPHERE: -3.75
OS_VPRISM_DIRECTION: SV
OD_VPRISM_DIRECTION: SV

## 2019-01-31 ASSESSMENT — REFRACTION_MANIFEST
OD_VA3: 20/
OS_VA2: 20/
OD_VA1: 20/
OD_VA2: 20/
OD_VA2: 20/
OU_VA: 20/
OD_VA3: 20/
OU_VA: 20/
OS_VA1: 20/
OS_VA1: 20/
OS_VA2: 20/
OD_VA1: 20/
OS_VA3: 20/
OS_VA3: 20/

## 2019-01-31 ASSESSMENT — VISUAL ACUITY
OD_BCVA: CF 2FT
OS_BCVA: 20/30+2

## 2019-01-31 ASSESSMENT — REFRACTION_AUTOREFRACTION
OS_CYLINDER: -1.00
OS_AXIS: 085
OD_SPHERE: -0.25
OD_AXIS: 069
OD_CYLINDER: -1.25
OS_SPHERE: -0.75

## 2019-01-31 ASSESSMENT — SPHEQUIV_DERIVED
OS_SPHEQUIV: -1.25
OD_SPHEQUIV: -0.875

## 2019-01-31 ASSESSMENT — LID POSITION - DERMATOCHALASIS
OS_DERMATOCHALASIS: LUL 1+
OD_DERMATOCHALASIS: RUL 1+

## 2019-01-31 ASSESSMENT — CONFRONTATIONAL VISUAL FIELD TEST (CVF)
OS_FINDINGS: FULL
OD_FINDINGS: FULL

## 2019-03-14 ENCOUNTER — DOCTOR'S OFFICE (OUTPATIENT)
Dept: URBAN - METROPOLITAN AREA CLINIC 136 | Facility: CLINIC | Age: 69
Setting detail: OPHTHALMOLOGY
End: 2019-03-14
Payer: COMMERCIAL

## 2019-03-14 DIAGNOSIS — Z96.1: ICD-10-CM

## 2019-03-14 DIAGNOSIS — H43.811: ICD-10-CM

## 2019-03-14 DIAGNOSIS — H35.3211: ICD-10-CM

## 2019-03-14 DIAGNOSIS — H40.013: ICD-10-CM

## 2019-03-14 DIAGNOSIS — H35.379: ICD-10-CM

## 2019-03-14 DIAGNOSIS — H35.3124: ICD-10-CM

## 2019-03-14 PROCEDURE — 92014 COMPRE OPH EXAM EST PT 1/>: CPT | Performed by: OPHTHALMOLOGY

## 2019-03-14 PROCEDURE — 92134 CPTRZ OPH DX IMG PST SGM RTA: CPT | Performed by: OPHTHALMOLOGY

## 2019-03-14 PROCEDURE — 67028 INJECTION EYE DRUG: CPT | Performed by: OPHTHALMOLOGY

## 2019-03-14 ASSESSMENT — CONFRONTATIONAL VISUAL FIELD TEST (CVF)
OD_FINDINGS: FULL
OS_FINDINGS: FULL

## 2019-03-14 ASSESSMENT — REFRACTION_MANIFEST
OD_VA2: 20/
OD_VA3: 20/
OU_VA: 20/
OS_VA1: 20/
OD_VA2: 20/
OD_VA1: 20/
OS_VA3: 20/
OS_VA1: 20/
OS_VA2: 20/
OD_VA3: 20/
OD_VA1: 20/
OS_VA2: 20/
OU_VA: 20/
OS_VA3: 20/

## 2019-03-14 ASSESSMENT — REFRACTION_CURRENTRX
OD_SPHERE: -3.75
OS_VPRISM_DIRECTION: SV
OS_CYLINDER: 0.00
OS_AXIS: 180
OD_CYLINDER: 0.00
OS_OVR_VA: 20/
OS_OVR_VA: 20/
OD_VPRISM_DIRECTION: SV
OD_OVR_VA: 20/
OS_OVR_VA: 20/
OD_OVR_VA: 20/
OD_OVR_VA: 20/
OD_AXIS: 180
OS_SPHERE: -3.75

## 2019-03-14 ASSESSMENT — REFRACTION_AUTOREFRACTION
OS_AXIS: 085
OS_CYLINDER: -1.00
OD_AXIS: 069
OS_SPHERE: -0.75
OD_CYLINDER: -1.25
OD_SPHERE: -0.25

## 2019-03-14 ASSESSMENT — VISUAL ACUITY
OS_BCVA: 20/30+2
OD_BCVA: CF 2FT

## 2019-03-14 ASSESSMENT — SPHEQUIV_DERIVED
OD_SPHEQUIV: -0.875
OS_SPHEQUIV: -1.25

## 2019-03-14 ASSESSMENT — LID POSITION - DERMATOCHALASIS
OD_DERMATOCHALASIS: RUL 1+
OS_DERMATOCHALASIS: LUL 1+

## 2019-04-17 ENCOUNTER — DOCTOR'S OFFICE (OUTPATIENT)
Dept: URBAN - METROPOLITAN AREA CLINIC 136 | Facility: CLINIC | Age: 69
Setting detail: OPHTHALMOLOGY
End: 2019-04-17
Payer: COMMERCIAL

## 2019-04-17 DIAGNOSIS — H35.3124: ICD-10-CM

## 2019-04-17 DIAGNOSIS — H35.373: ICD-10-CM

## 2019-04-17 DIAGNOSIS — H35.3211: ICD-10-CM

## 2019-04-17 DIAGNOSIS — H43.811: ICD-10-CM

## 2019-04-17 PROCEDURE — 67028 INJECTION EYE DRUG: CPT | Performed by: OPHTHALMOLOGY

## 2019-04-17 PROCEDURE — 92014 COMPRE OPH EXAM EST PT 1/>: CPT | Performed by: OPHTHALMOLOGY

## 2019-04-17 PROCEDURE — 92134 CPTRZ OPH DX IMG PST SGM RTA: CPT | Performed by: OPHTHALMOLOGY

## 2019-04-17 ASSESSMENT — REFRACTION_MANIFEST
OD_VA3: 20/
OS_VA2: 20/
OD_VA2: 20/
OD_VA1: 20/
OS_VA1: 20/
OU_VA: 20/
OD_VA2: 20/
OD_VA1: 20/
OS_VA1: 20/
OS_VA3: 20/
OS_VA2: 20/
OD_VA3: 20/
OS_VA3: 20/
OU_VA: 20/

## 2019-04-17 ASSESSMENT — REFRACTION_AUTOREFRACTION
OD_AXIS: 069
OS_AXIS: 085
OD_SPHERE: -0.25
OS_CYLINDER: -1.00
OD_CYLINDER: -1.25
OS_SPHERE: -0.75

## 2019-04-17 ASSESSMENT — CONFRONTATIONAL VISUAL FIELD TEST (CVF)
OS_FINDINGS: FULL
OD_FINDINGS: FULL

## 2019-04-17 ASSESSMENT — SPHEQUIV_DERIVED
OD_SPHEQUIV: -0.875
OS_SPHEQUIV: -1.25

## 2019-04-17 ASSESSMENT — REFRACTION_CURRENTRX
OD_OVR_VA: 20/
OD_OVR_VA: 20/
OS_AXIS: 180
OS_OVR_VA: 20/
OD_AXIS: 180
OD_VPRISM_DIRECTION: SV
OS_OVR_VA: 20/
OS_VPRISM_DIRECTION: SV
OD_OVR_VA: 20/
OD_CYLINDER: 0.00
OS_SPHERE: -3.75
OS_OVR_VA: 20/
OS_CYLINDER: 0.00
OD_SPHERE: -3.75

## 2019-04-17 ASSESSMENT — VISUAL ACUITY
OS_BCVA: 20/30-2
OD_BCVA: CF 2FT

## 2019-04-17 ASSESSMENT — LID POSITION - DERMATOCHALASIS
OD_DERMATOCHALASIS: RUL 1+
OS_DERMATOCHALASIS: LUL 1+

## 2019-05-22 ENCOUNTER — DOCTOR'S OFFICE (OUTPATIENT)
Dept: URBAN - METROPOLITAN AREA CLINIC 136 | Facility: CLINIC | Age: 69
Setting detail: OPHTHALMOLOGY
End: 2019-05-22
Payer: COMMERCIAL

## 2019-05-22 DIAGNOSIS — H35.3211: ICD-10-CM

## 2019-05-22 PROCEDURE — 67028 INJECTION EYE DRUG: CPT | Performed by: OPHTHALMOLOGY

## 2019-05-22 PROCEDURE — 92134 CPTRZ OPH DX IMG PST SGM RTA: CPT | Performed by: OPHTHALMOLOGY

## 2019-05-22 ASSESSMENT — REFRACTION_MANIFEST
OD_VA2: 20/
OS_VA2: 20/
OD_VA3: 20/
OU_VA: 20/
OD_VA2: 20/
OS_VA3: 20/
OD_VA3: 20/
OS_VA1: 20/
OU_VA: 20/
OS_VA3: 20/
OS_VA1: 20/
OS_VA2: 20/
OD_VA1: 20/
OD_VA1: 20/

## 2019-05-22 ASSESSMENT — REFRACTION_CURRENTRX
OS_VPRISM_DIRECTION: SV
OS_OVR_VA: 20/
OD_SPHERE: -3.75
OS_OVR_VA: 20/
OD_AXIS: 180
OD_OVR_VA: 20/
OS_AXIS: 180
OS_SPHERE: -3.75
OD_VPRISM_DIRECTION: SV
OD_OVR_VA: 20/
OS_CYLINDER: 0.00
OD_CYLINDER: 0.00
OD_OVR_VA: 20/
OS_OVR_VA: 20/

## 2019-05-22 ASSESSMENT — VISUAL ACUITY
OS_BCVA: 20/30-2
OD_BCVA: CF 1FT

## 2019-05-22 ASSESSMENT — CONFRONTATIONAL VISUAL FIELD TEST (CVF)
OS_FINDINGS: FULL
OD_FINDINGS: FULL

## 2019-05-22 ASSESSMENT — REFRACTION_AUTOREFRACTION
OD_AXIS: 069
OS_AXIS: 085
OS_CYLINDER: -1.00
OS_SPHERE: -0.75
OD_CYLINDER: -1.25
OD_SPHERE: -0.25

## 2019-05-22 ASSESSMENT — LID POSITION - DERMATOCHALASIS
OD_DERMATOCHALASIS: RUL 1+
OS_DERMATOCHALASIS: LUL 1+

## 2019-05-22 ASSESSMENT — SPHEQUIV_DERIVED
OS_SPHEQUIV: -1.25
OD_SPHEQUIV: -0.875

## 2019-06-11 ENCOUNTER — RX ONLY (RX ONLY)
Age: 69
End: 2019-06-11

## 2019-06-11 ENCOUNTER — DOCTOR'S OFFICE (OUTPATIENT)
Dept: URBAN - NONMETROPOLITAN AREA CLINIC 1 | Facility: CLINIC | Age: 69
Setting detail: OPHTHALMOLOGY
End: 2019-06-11
Payer: COMMERCIAL

## 2019-06-11 DIAGNOSIS — H43.811: ICD-10-CM

## 2019-06-11 DIAGNOSIS — H40.013: ICD-10-CM

## 2019-06-11 DIAGNOSIS — H35.3211: ICD-10-CM

## 2019-06-11 DIAGNOSIS — H35.379: ICD-10-CM

## 2019-06-11 DIAGNOSIS — H35.3124: ICD-10-CM

## 2019-06-11 DIAGNOSIS — Z96.1: ICD-10-CM

## 2019-06-11 PROCEDURE — 76514 ECHO EXAM OF EYE THICKNESS: CPT | Performed by: OPHTHALMOLOGY

## 2019-06-11 PROCEDURE — 92014 COMPRE OPH EXAM EST PT 1/>: CPT | Performed by: OPHTHALMOLOGY

## 2019-06-11 PROCEDURE — 92133 CPTRZD OPH DX IMG PST SGM ON: CPT | Performed by: OPHTHALMOLOGY

## 2019-06-11 ASSESSMENT — REFRACTION_CURRENTRX
OD_OVR_VA: 20/
OD_CYLINDER: 0.00
OS_OVR_VA: 20/
OD_SPHERE: -3.75
OD_OVR_VA: 20/
OS_OVR_VA: 20/
OS_AXIS: 180
OS_SPHERE: -3.75
OS_VPRISM_DIRECTION: SV
OS_CYLINDER: 0.00
OD_OVR_VA: 20/
OD_AXIS: 180
OS_OVR_VA: 20/
OD_VPRISM_DIRECTION: SV

## 2019-06-11 ASSESSMENT — LID POSITION - DERMATOCHALASIS
OD_DERMATOCHALASIS: RUL 1+
OS_DERMATOCHALASIS: LUL 1+

## 2019-06-11 ASSESSMENT — REFRACTION_MANIFEST
OU_VA: 20/
OS_VA2: 20/
OS_VA3: 20/
OS_VA1: 20/
OS_VA3: 20/
OU_VA: 20/
OD_VA1: 20/
OD_VA3: 20/
OD_VA1: 20/
OD_VA2: 20/
OS_VA2: 20/
OD_VA2: 20/
OS_VA1: 20/
OD_VA3: 20/

## 2019-06-11 ASSESSMENT — REFRACTION_AUTOREFRACTION
OD_SPHERE: -0.25
OD_CYLINDER: -0.75
OD_AXIS: 87

## 2019-06-11 ASSESSMENT — PACHYMETRY
OS_CT_CORRECTION: -1
OS_CT_UM: 568
OD_CT_CORRECTION: -1
OD_CT_UM: 558

## 2019-06-11 ASSESSMENT — CONFRONTATIONAL VISUAL FIELD TEST (CVF)
OS_FINDINGS: FULL
OD_FINDINGS: FULL

## 2019-06-11 ASSESSMENT — SPHEQUIV_DERIVED: OD_SPHEQUIV: -0.625

## 2019-06-11 ASSESSMENT — VISUAL ACUITY
OS_BCVA: 20/50+1
OD_BCVA: 20/CF X 2'

## 2019-06-25 ENCOUNTER — DOCTOR'S OFFICE (OUTPATIENT)
Dept: URBAN - METROPOLITAN AREA CLINIC 136 | Facility: CLINIC | Age: 69
Setting detail: OPHTHALMOLOGY
End: 2019-06-25
Payer: COMMERCIAL

## 2019-06-25 DIAGNOSIS — H35.3211: ICD-10-CM

## 2019-06-25 DIAGNOSIS — H35.3124: ICD-10-CM

## 2019-06-25 DIAGNOSIS — H35.379: ICD-10-CM

## 2019-06-25 DIAGNOSIS — H43.811: ICD-10-CM

## 2019-06-25 PROCEDURE — 92134 CPTRZ OPH DX IMG PST SGM RTA: CPT | Performed by: OPHTHALMOLOGY

## 2019-06-25 PROCEDURE — 67028 INJECTION EYE DRUG: CPT | Performed by: OPHTHALMOLOGY

## 2019-06-25 ASSESSMENT — LID POSITION - DERMATOCHALASIS
OS_DERMATOCHALASIS: LUL 1+
OD_DERMATOCHALASIS: RUL 1+

## 2019-06-25 ASSESSMENT — REFRACTION_MANIFEST
OS_VA3: 20/
OS_VA1: 20/
OS_VA2: 20/
OS_VA2: 20/
OS_VA1: 20/
OD_VA1: 20/
OD_VA1: 20/
OU_VA: 20/
OD_VA2: 20/
OU_VA: 20/
OS_VA3: 20/
OD_VA2: 20/
OD_VA3: 20/
OD_VA3: 20/

## 2019-06-25 ASSESSMENT — REFRACTION_CURRENTRX
OS_CYLINDER: 0.00
OS_OVR_VA: 20/
OD_AXIS: 180
OD_OVR_VA: 20/
OS_VPRISM_DIRECTION: SV
OD_SPHERE: -3.75
OD_OVR_VA: 20/
OD_OVR_VA: 20/
OS_SPHERE: -3.75
OS_AXIS: 180
OD_CYLINDER: 0.00
OS_OVR_VA: 20/
OD_VPRISM_DIRECTION: SV
OS_OVR_VA: 20/

## 2019-06-25 ASSESSMENT — REFRACTION_AUTOREFRACTION
OD_CYLINDER: -0.75
OD_AXIS: 87
OD_SPHERE: -0.25

## 2019-06-25 ASSESSMENT — VISUAL ACUITY: OS_BCVA: 20/30-2

## 2019-06-25 ASSESSMENT — CONFRONTATIONAL VISUAL FIELD TEST (CVF)
OD_FINDINGS: FULL
OS_FINDINGS: FULL

## 2019-06-25 ASSESSMENT — SPHEQUIV_DERIVED: OD_SPHEQUIV: -0.625

## 2019-07-23 ENCOUNTER — DOCTOR'S OFFICE (OUTPATIENT)
Dept: URBAN - METROPOLITAN AREA CLINIC 136 | Facility: CLINIC | Age: 69
Setting detail: OPHTHALMOLOGY
End: 2019-07-23
Payer: COMMERCIAL

## 2019-07-23 DIAGNOSIS — H35.3124: ICD-10-CM

## 2019-07-23 DIAGNOSIS — H35.3211: ICD-10-CM

## 2019-07-23 DIAGNOSIS — H35.379: ICD-10-CM

## 2019-07-23 DIAGNOSIS — H43.811: ICD-10-CM

## 2019-07-23 PROCEDURE — 92134 CPTRZ OPH DX IMG PST SGM RTA: CPT | Performed by: OPHTHALMOLOGY

## 2019-07-23 PROCEDURE — 67028 INJECTION EYE DRUG: CPT | Performed by: OPHTHALMOLOGY

## 2019-07-23 ASSESSMENT — REFRACTION_AUTOREFRACTION
OD_CYLINDER: -0.75
OD_SPHERE: -0.25
OD_AXIS: 87

## 2019-07-23 ASSESSMENT — REFRACTION_CURRENTRX
OS_VPRISM_DIRECTION: SV
OS_OVR_VA: 20/
OD_VPRISM_DIRECTION: SV
OD_OVR_VA: 20/
OD_SPHERE: -3.75
OS_OVR_VA: 20/
OS_AXIS: 180
OS_CYLINDER: 0.00
OD_OVR_VA: 20/
OD_CYLINDER: 0.00
OD_OVR_VA: 20/
OS_OVR_VA: 20/
OS_SPHERE: -3.75
OD_AXIS: 180

## 2019-07-23 ASSESSMENT — REFRACTION_MANIFEST
OS_VA2: 20/
OD_VA1: 20/
OU_VA: 20/
OS_VA1: 20/
OU_VA: 20/
OS_VA3: 20/
OD_VA2: 20/
OD_VA3: 20/
OS_VA3: 20/
OS_VA2: 20/
OS_VA1: 20/
OD_VA1: 20/
OD_VA2: 20/
OD_VA3: 20/

## 2019-07-23 ASSESSMENT — CONFRONTATIONAL VISUAL FIELD TEST (CVF)
OD_FINDINGS: FULL
OS_FINDINGS: FULL

## 2019-07-23 ASSESSMENT — SPHEQUIV_DERIVED: OD_SPHEQUIV: -0.625

## 2019-07-23 ASSESSMENT — LID POSITION - DERMATOCHALASIS
OD_DERMATOCHALASIS: RUL 1+
OS_DERMATOCHALASIS: LUL 1+

## 2019-07-23 ASSESSMENT — VISUAL ACUITY: OS_BCVA: 20/40-1

## 2019-08-28 ENCOUNTER — DOCTOR'S OFFICE (OUTPATIENT)
Dept: URBAN - METROPOLITAN AREA CLINIC 136 | Facility: CLINIC | Age: 69
Setting detail: OPHTHALMOLOGY
End: 2019-08-28
Payer: COMMERCIAL

## 2019-08-28 DIAGNOSIS — H35.3211: ICD-10-CM

## 2019-08-28 PROCEDURE — 67028 INJECTION EYE DRUG: CPT | Performed by: OPHTHALMOLOGY

## 2019-08-28 PROCEDURE — 92134 CPTRZ OPH DX IMG PST SGM RTA: CPT | Performed by: OPHTHALMOLOGY

## 2019-08-28 ASSESSMENT — REFRACTION_MANIFEST
OD_VA1: 20/
OS_VA3: 20/
OU_VA: 20/
OU_VA: 20/
OD_VA3: 20/
OD_VA3: 20/
OD_VA2: 20/
OS_VA3: 20/
OD_VA1: 20/
OS_VA2: 20/
OD_VA2: 20/
OS_VA1: 20/
OS_VA1: 20/
OS_VA2: 20/

## 2019-08-28 ASSESSMENT — VISUAL ACUITY
OD_BCVA: 20/CF @ 2'
OS_BCVA: 20/30+2

## 2019-08-28 ASSESSMENT — REFRACTION_CURRENTRX
OD_CYLINDER: 0.00
OS_OVR_VA: 20/
OS_VPRISM_DIRECTION: SV
OD_AXIS: 180
OS_CYLINDER: 0.00
OS_OVR_VA: 20/
OD_OVR_VA: 20/
OD_VPRISM_DIRECTION: SV
OS_OVR_VA: 20/
OD_OVR_VA: 20/
OS_SPHERE: -3.75
OS_AXIS: 180
OD_OVR_VA: 20/
OD_SPHERE: -3.75

## 2019-08-28 ASSESSMENT — REFRACTION_AUTOREFRACTION
OD_AXIS: 87
OD_CYLINDER: -0.75
OD_SPHERE: -0.25

## 2019-08-28 ASSESSMENT — CONFRONTATIONAL VISUAL FIELD TEST (CVF)
OD_FINDINGS: FULL
OS_FINDINGS: FULL

## 2019-08-28 ASSESSMENT — SPHEQUIV_DERIVED: OD_SPHEQUIV: -0.625

## 2019-09-25 ENCOUNTER — DOCTOR'S OFFICE (OUTPATIENT)
Dept: URBAN - METROPOLITAN AREA CLINIC 136 | Facility: CLINIC | Age: 69
Setting detail: OPHTHALMOLOGY
End: 2019-09-25
Payer: COMMERCIAL

## 2019-09-25 DIAGNOSIS — H43.811: ICD-10-CM

## 2019-09-25 DIAGNOSIS — H35.3124: ICD-10-CM

## 2019-09-25 DIAGNOSIS — H35.3211: ICD-10-CM

## 2019-09-25 PROCEDURE — 67028 INJECTION EYE DRUG: CPT | Performed by: OPHTHALMOLOGY

## 2019-09-25 PROCEDURE — 92012 INTRM OPH EXAM EST PATIENT: CPT | Performed by: OPHTHALMOLOGY

## 2019-09-25 PROCEDURE — 92134 CPTRZ OPH DX IMG PST SGM RTA: CPT | Performed by: OPHTHALMOLOGY

## 2019-09-25 ASSESSMENT — SPHEQUIV_DERIVED: OD_SPHEQUIV: -0.625

## 2019-09-25 ASSESSMENT — REFRACTION_MANIFEST
OS_VA3: 20/
OS_VA1: 20/
OD_VA1: 20/
OD_VA2: 20/
OS_VA3: 20/
OD_VA3: 20/
OS_VA2: 20/
OU_VA: 20/
OD_VA2: 20/
OS_VA2: 20/
OU_VA: 20/
OD_VA3: 20/
OD_VA1: 20/
OS_VA1: 20/

## 2019-09-25 ASSESSMENT — REFRACTION_CURRENTRX
OS_OVR_VA: 20/
OS_AXIS: 180
OD_SPHERE: -3.75
OS_OVR_VA: 20/
OS_OVR_VA: 20/
OD_OVR_VA: 20/
OD_OVR_VA: 20/
OD_CYLINDER: 0.00
OD_OVR_VA: 20/
OD_VPRISM_DIRECTION: SV
OS_SPHERE: -3.75
OS_VPRISM_DIRECTION: SV
OS_CYLINDER: 0.00
OD_AXIS: 180

## 2019-09-25 ASSESSMENT — VISUAL ACUITY
OS_BCVA: 20/25-2
OD_BCVA: 20/CF @ 2'

## 2019-09-25 ASSESSMENT — REFRACTION_AUTOREFRACTION
OD_SPHERE: -0.25
OD_CYLINDER: -0.75
OD_AXIS: 87

## 2019-09-25 ASSESSMENT — CONFRONTATIONAL VISUAL FIELD TEST (CVF)
OS_FINDINGS: FULL
OD_FINDINGS: FULL

## 2019-09-25 ASSESSMENT — LID POSITION - DERMATOCHALASIS
OS_DERMATOCHALASIS: LUL 1+
OD_DERMATOCHALASIS: RUL 1+

## 2019-10-23 ENCOUNTER — DOCTOR'S OFFICE (OUTPATIENT)
Dept: URBAN - METROPOLITAN AREA CLINIC 136 | Facility: CLINIC | Age: 69
Setting detail: OPHTHALMOLOGY
End: 2019-10-23
Payer: COMMERCIAL

## 2019-10-23 DIAGNOSIS — H35.3211: ICD-10-CM

## 2019-10-23 PROCEDURE — 67028 INJECTION EYE DRUG: CPT | Performed by: OPHTHALMOLOGY

## 2019-10-23 ASSESSMENT — REFRACTION_AUTOREFRACTION
OD_SPHERE: -0.25
OD_AXIS: 87
OD_CYLINDER: -0.75

## 2019-10-23 ASSESSMENT — SPHEQUIV_DERIVED: OD_SPHEQUIV: -0.625

## 2019-10-23 ASSESSMENT — VISUAL ACUITY
OS_BCVA: 20/25-1
OD_BCVA: 20/CF @ 2'

## 2019-11-27 ENCOUNTER — DOCTOR'S OFFICE (OUTPATIENT)
Dept: URBAN - METROPOLITAN AREA CLINIC 136 | Facility: CLINIC | Age: 69
Setting detail: OPHTHALMOLOGY
End: 2019-11-27
Payer: COMMERCIAL

## 2019-11-27 DIAGNOSIS — H35.3124: ICD-10-CM

## 2019-11-27 DIAGNOSIS — H43.811: ICD-10-CM

## 2019-11-27 DIAGNOSIS — H35.3211: ICD-10-CM

## 2019-11-27 PROCEDURE — 67028 INJECTION EYE DRUG: CPT | Performed by: OPHTHALMOLOGY

## 2019-11-27 PROCEDURE — 92134 CPTRZ OPH DX IMG PST SGM RTA: CPT | Performed by: OPHTHALMOLOGY

## 2019-11-27 PROCEDURE — 92012 INTRM OPH EXAM EST PATIENT: CPT | Performed by: OPHTHALMOLOGY

## 2019-11-27 ASSESSMENT — REFRACTION_CURRENTRX
OS_OVR_VA: 20/
OD_OVR_VA: 20/
OD_OVR_VA: 20/
OS_OVR_VA: 20/
OD_OVR_VA: 20/
OS_OVR_VA: 20/

## 2019-11-27 ASSESSMENT — REFRACTION_MANIFEST
OS_VA3: 20/
OS_VA3: 20/
OD_VA1: 20/
OD_VA2: 20/
OS_VA2: 20/
OU_VA: 20/
OD_VA2: 20/
OS_VA2: 20/
OD_VA1: 20/
OD_VA3: 20/
OS_VA1: 20/
OU_VA: 20/
OS_VA1: 20/
OD_VA3: 20/

## 2019-11-27 ASSESSMENT — LID POSITION - DERMATOCHALASIS
OS_DERMATOCHALASIS: LUL 1+
OD_DERMATOCHALASIS: RUL 1+

## 2019-11-27 ASSESSMENT — CONFRONTATIONAL VISUAL FIELD TEST (CVF)
OS_FINDINGS: FULL
OD_FINDINGS: FULL

## 2019-11-27 ASSESSMENT — SPHEQUIV_DERIVED: OD_SPHEQUIV: -0.625

## 2019-11-27 ASSESSMENT — VISUAL ACUITY
OS_BCVA: 20/25-1
OD_BCVA: 20/CF @ 2'

## 2019-11-27 ASSESSMENT — REFRACTION_AUTOREFRACTION
OD_SPHERE: -0.25
OD_AXIS: 87
OD_CYLINDER: -0.75

## 2019-12-13 ENCOUNTER — RX ONLY (RX ONLY)
Age: 69
End: 2019-12-13

## 2019-12-13 ENCOUNTER — DOCTOR'S OFFICE (OUTPATIENT)
Dept: URBAN - NONMETROPOLITAN AREA CLINIC 1 | Facility: CLINIC | Age: 69
Setting detail: OPHTHALMOLOGY
End: 2019-12-13
Payer: COMMERCIAL

## 2019-12-13 DIAGNOSIS — Z96.1: ICD-10-CM

## 2019-12-13 DIAGNOSIS — H43.811: ICD-10-CM

## 2019-12-13 DIAGNOSIS — H35.3211: ICD-10-CM

## 2019-12-13 DIAGNOSIS — H35.3124: ICD-10-CM

## 2019-12-13 DIAGNOSIS — H40.013: ICD-10-CM

## 2019-12-13 PROCEDURE — 92250 FUNDUS PHOTOGRAPHY W/I&R: CPT | Performed by: OPHTHALMOLOGY

## 2019-12-13 PROCEDURE — 92083 EXTENDED VISUAL FIELD XM: CPT | Performed by: OPHTHALMOLOGY

## 2019-12-13 PROCEDURE — 92014 COMPRE OPH EXAM EST PT 1/>: CPT | Performed by: OPHTHALMOLOGY

## 2019-12-13 ASSESSMENT — REFRACTION_CURRENTRX
OD_OVR_VA: 20/
OS_OVR_VA: 20/
OS_OVR_VA: 20/
OD_OVR_VA: 20/
OS_OVR_VA: 20/
OD_OVR_VA: 20/

## 2019-12-13 ASSESSMENT — REFRACTION_MANIFEST
OS_VA2: 20/
OD_VA3: 20/
OS_VA1: 20/
OU_VA: 20/
OS_VA3: 20/
OS_VA1: 20/
OD_VA3: 20/
OD_VA2: 20/
OD_VA1: 20/
OU_VA: 20/
OS_VA3: 20/
OD_VA1: 20/
OD_VA2: 20/
OS_VA2: 20/

## 2019-12-13 ASSESSMENT — REFRACTION_AUTOREFRACTION
OS_CYLINDER: -1.50
OS_SPHERE: +0.50
OS_AXIS: 44
OD_CYLINDER: -0.75
OD_SPHERE: 0.00
OD_AXIS: 94

## 2019-12-13 ASSESSMENT — LID POSITION - DERMATOCHALASIS
OD_DERMATOCHALASIS: RUL 1+
OS_DERMATOCHALASIS: LUL 1+

## 2019-12-13 ASSESSMENT — CONFRONTATIONAL VISUAL FIELD TEST (CVF)
OS_FINDINGS: FULL
OD_FINDINGS: FULL

## 2019-12-13 ASSESSMENT — VISUAL ACUITY
OD_BCVA: 20/CF @ 2'
OS_BCVA: 20/50-1

## 2019-12-13 ASSESSMENT — SPHEQUIV_DERIVED
OS_SPHEQUIV: -0.25
OD_SPHEQUIV: -0.375

## 2019-12-31 ENCOUNTER — DOCTOR'S OFFICE (OUTPATIENT)
Dept: URBAN - METROPOLITAN AREA CLINIC 136 | Facility: CLINIC | Age: 69
Setting detail: OPHTHALMOLOGY
End: 2019-12-31
Payer: COMMERCIAL

## 2019-12-31 DIAGNOSIS — H35.3211: ICD-10-CM

## 2019-12-31 PROCEDURE — 67028 INJECTION EYE DRUG: CPT | Performed by: OPHTHALMOLOGY

## 2019-12-31 PROCEDURE — 92134 CPTRZ OPH DX IMG PST SGM RTA: CPT | Performed by: OPHTHALMOLOGY

## 2019-12-31 ASSESSMENT — REFRACTION_AUTOREFRACTION
OS_CYLINDER: -1.50
OD_AXIS: 94
OS_AXIS: 44
OD_SPHERE: 0.00
OS_SPHERE: +0.50
OD_CYLINDER: -0.75

## 2019-12-31 ASSESSMENT — CONFRONTATIONAL VISUAL FIELD TEST (CVF)
OD_FINDINGS: FULL
OS_FINDINGS: FULL

## 2019-12-31 ASSESSMENT — SPHEQUIV_DERIVED
OD_SPHEQUIV: -0.375
OS_SPHEQUIV: -0.25

## 2019-12-31 ASSESSMENT — VISUAL ACUITY
OS_BCVA: 20/30-1
OD_BCVA: 20/CF @ 2'

## 2020-01-30 ENCOUNTER — DOCTOR'S OFFICE (OUTPATIENT)
Dept: URBAN - METROPOLITAN AREA CLINIC 136 | Facility: CLINIC | Age: 70
Setting detail: OPHTHALMOLOGY
End: 2020-01-30
Payer: COMMERCIAL

## 2020-01-30 VITALS — HEIGHT: 60 IN

## 2020-01-30 DIAGNOSIS — H35.3211: ICD-10-CM

## 2020-01-30 PROCEDURE — 67028 INJECTION EYE DRUG: CPT | Performed by: OPHTHALMOLOGY

## 2020-01-30 PROCEDURE — 92134 CPTRZ OPH DX IMG PST SGM RTA: CPT | Performed by: OPHTHALMOLOGY

## 2020-01-30 PROCEDURE — 92012 INTRM OPH EXAM EST PATIENT: CPT | Performed by: OPHTHALMOLOGY

## 2020-01-30 ASSESSMENT — SPHEQUIV_DERIVED
OD_SPHEQUIV: -0.375
OS_SPHEQUIV: -0.25

## 2020-01-30 ASSESSMENT — REFRACTION_AUTOREFRACTION
OD_SPHERE: 0.00
OS_CYLINDER: -1.50
OS_AXIS: 44
OS_SPHERE: +0.50
OD_CYLINDER: -0.75
OD_AXIS: 94

## 2020-01-30 ASSESSMENT — LID POSITION - DERMATOCHALASIS
OS_DERMATOCHALASIS: LUL 1+
OD_DERMATOCHALASIS: RUL 1+

## 2020-01-30 ASSESSMENT — VISUAL ACUITY
OS_BCVA: 20/30-1
OD_BCVA: 20/CF@2FT

## 2020-01-30 ASSESSMENT — CONFRONTATIONAL VISUAL FIELD TEST (CVF)
OS_FINDINGS: FULL
OD_FINDINGS: FULL

## 2020-03-04 ENCOUNTER — DOCTOR'S OFFICE (OUTPATIENT)
Dept: URBAN - METROPOLITAN AREA CLINIC 136 | Facility: CLINIC | Age: 70
Setting detail: OPHTHALMOLOGY
End: 2020-03-04
Payer: COMMERCIAL

## 2020-03-04 DIAGNOSIS — H35.3211: ICD-10-CM

## 2020-03-04 PROCEDURE — 67028 INJECTION EYE DRUG: CPT | Performed by: OPHTHALMOLOGY

## 2020-03-04 PROCEDURE — 92134 CPTRZ OPH DX IMG PST SGM RTA: CPT | Performed by: OPHTHALMOLOGY

## 2020-03-04 ASSESSMENT — REFRACTION_AUTOREFRACTION
OS_CYLINDER: -1.50
OD_AXIS: 94
OD_CYLINDER: -0.75
OS_AXIS: 44
OS_SPHERE: +0.50
OD_SPHERE: 0.00

## 2020-03-04 ASSESSMENT — VISUAL ACUITY
OS_BCVA: 20/50-1
OD_BCVA: 20/CF@ 2FT

## 2020-03-04 ASSESSMENT — SPHEQUIV_DERIVED
OD_SPHEQUIV: -0.375
OS_SPHEQUIV: -0.25

## 2020-03-04 ASSESSMENT — CONFRONTATIONAL VISUAL FIELD TEST (CVF)
OS_FINDINGS: FULL
OD_FINDINGS: FULL

## 2020-04-01 ENCOUNTER — DOCTOR'S OFFICE (OUTPATIENT)
Dept: URBAN - METROPOLITAN AREA CLINIC 136 | Facility: CLINIC | Age: 70
Setting detail: OPHTHALMOLOGY
End: 2020-04-01
Payer: COMMERCIAL

## 2020-04-01 VITALS — HEIGHT: 60 IN

## 2020-04-01 DIAGNOSIS — H35.3211: ICD-10-CM

## 2020-04-01 DIAGNOSIS — H35.3213: ICD-10-CM

## 2020-04-01 PROCEDURE — 67028 INJECTION EYE DRUG: CPT | Performed by: OPHTHALMOLOGY

## 2020-04-01 PROCEDURE — 92134 CPTRZ OPH DX IMG PST SGM RTA: CPT | Performed by: OPHTHALMOLOGY

## 2020-04-01 ASSESSMENT — REFRACTION_AUTOREFRACTION
OD_CYLINDER: -0.75
OS_SPHERE: +0.50
OS_AXIS: 44
OD_AXIS: 94
OS_CYLINDER: -1.50
OD_SPHERE: 0.00

## 2020-04-01 ASSESSMENT — SPHEQUIV_DERIVED
OD_SPHEQUIV: -0.375
OS_SPHEQUIV: -0.25

## 2020-04-01 ASSESSMENT — VISUAL ACUITY
OD_BCVA: 20/CF XS 4'
OS_BCVA: 20/25

## 2020-04-01 ASSESSMENT — LID POSITION - DERMATOCHALASIS
OD_DERMATOCHALASIS: RUL 1+
OS_DERMATOCHALASIS: LUL 1+

## 2020-04-29 ENCOUNTER — DOCTOR'S OFFICE (OUTPATIENT)
Dept: URBAN - METROPOLITAN AREA CLINIC 136 | Facility: CLINIC | Age: 70
Setting detail: OPHTHALMOLOGY
End: 2020-04-29
Payer: COMMERCIAL

## 2020-04-29 VITALS — HEIGHT: 60 IN

## 2020-04-29 DIAGNOSIS — H35.3211: ICD-10-CM

## 2020-04-29 PROCEDURE — 92134 CPTRZ OPH DX IMG PST SGM RTA: CPT | Performed by: OPHTHALMOLOGY

## 2020-04-29 PROCEDURE — 67028 INJECTION EYE DRUG: CPT | Performed by: OPHTHALMOLOGY

## 2020-04-29 ASSESSMENT — REFRACTION_AUTOREFRACTION
OD_AXIS: 94
OD_SPHERE: 0.00
OS_SPHERE: +0.50
OS_CYLINDER: -1.50
OD_CYLINDER: -0.75
OS_AXIS: 44

## 2020-04-29 ASSESSMENT — VISUAL ACUITY
OS_BCVA: 20/25
OD_BCVA: 20/CF XS 3'

## 2020-04-29 ASSESSMENT — SPHEQUIV_DERIVED
OD_SPHEQUIV: -0.375
OS_SPHEQUIV: -0.25

## 2020-05-28 ENCOUNTER — DOCTOR'S OFFICE (OUTPATIENT)
Dept: URBAN - METROPOLITAN AREA CLINIC 136 | Facility: CLINIC | Age: 70
Setting detail: OPHTHALMOLOGY
End: 2020-05-28
Payer: COMMERCIAL

## 2020-05-28 DIAGNOSIS — H35.3211: ICD-10-CM

## 2020-05-28 PROCEDURE — 92134 CPTRZ OPH DX IMG PST SGM RTA: CPT | Performed by: OPHTHALMOLOGY

## 2020-05-28 PROCEDURE — 67028 INJECTION EYE DRUG: CPT | Performed by: OPHTHALMOLOGY

## 2020-05-28 ASSESSMENT — REFRACTION_AUTOREFRACTION
OD_AXIS: 94
OS_AXIS: 44
OS_SPHERE: +0.50
OD_SPHERE: 0.00
OS_CYLINDER: -1.50
OD_CYLINDER: -0.75

## 2020-05-28 ASSESSMENT — VISUAL ACUITY
OD_BCVA: 20/CF XS 3'
OS_BCVA: 20/25

## 2020-05-28 ASSESSMENT — SPHEQUIV_DERIVED
OS_SPHEQUIV: -0.25
OD_SPHEQUIV: -0.375

## 2020-06-12 ENCOUNTER — DOCTOR'S OFFICE (OUTPATIENT)
Dept: URBAN - NONMETROPOLITAN AREA CLINIC 1 | Facility: CLINIC | Age: 70
Setting detail: OPHTHALMOLOGY
End: 2020-06-12
Payer: COMMERCIAL

## 2020-06-12 DIAGNOSIS — H40.013: ICD-10-CM

## 2020-06-12 DIAGNOSIS — Z96.1: ICD-10-CM

## 2020-06-12 PROCEDURE — 92133 CPTRZD OPH DX IMG PST SGM ON: CPT | Performed by: OPHTHALMOLOGY

## 2020-06-12 PROCEDURE — 92014 COMPRE OPH EXAM EST PT 1/>: CPT | Performed by: OPHTHALMOLOGY

## 2020-06-12 ASSESSMENT — REFRACTION_AUTOREFRACTION
OS_CYLINDER: -1.50
OD_CYLINDER: -0.50
OS_AXIS: 89
OS_SPHERE: -0.25
OD_AXIS: 73
OD_SPHERE: -0.50

## 2020-06-12 ASSESSMENT — CONFRONTATIONAL VISUAL FIELD TEST (CVF)
OD_FINDINGS: FULL
OS_FINDINGS: FULL

## 2020-06-12 ASSESSMENT — LID POSITION - DERMATOCHALASIS
OD_DERMATOCHALASIS: RUL 1+
OS_DERMATOCHALASIS: LUL 1+

## 2020-06-12 ASSESSMENT — SPHEQUIV_DERIVED
OD_SPHEQUIV: -0.75
OS_SPHEQUIV: -1

## 2020-06-12 ASSESSMENT — VISUAL ACUITY
OD_BCVA: CF 2FT
OS_BCVA: 20/50-1

## 2020-06-30 ENCOUNTER — DOCTOR'S OFFICE (OUTPATIENT)
Dept: URBAN - METROPOLITAN AREA CLINIC 136 | Facility: CLINIC | Age: 70
Setting detail: OPHTHALMOLOGY
End: 2020-06-30
Payer: COMMERCIAL

## 2020-06-30 DIAGNOSIS — H43.811: ICD-10-CM

## 2020-06-30 DIAGNOSIS — H35.379: ICD-10-CM

## 2020-06-30 DIAGNOSIS — H35.3124: ICD-10-CM

## 2020-06-30 DIAGNOSIS — H35.3211: ICD-10-CM

## 2020-06-30 PROCEDURE — 92134 CPTRZ OPH DX IMG PST SGM RTA: CPT | Performed by: OPHTHALMOLOGY

## 2020-06-30 PROCEDURE — 92012 INTRM OPH EXAM EST PATIENT: CPT | Performed by: OPHTHALMOLOGY

## 2020-06-30 PROCEDURE — 67028 INJECTION EYE DRUG: CPT | Performed by: OPHTHALMOLOGY

## 2020-06-30 ASSESSMENT — VISUAL ACUITY
OD_BCVA: CF @ 5FT
OS_BCVA: 20/40-1

## 2020-06-30 ASSESSMENT — CONFRONTATIONAL VISUAL FIELD TEST (CVF)
OS_FINDINGS: FULL
OD_FINDINGS: FULL

## 2020-06-30 ASSESSMENT — REFRACTION_AUTOREFRACTION
OS_AXIS: 89
OS_SPHERE: -0.25
OD_CYLINDER: -0.50
OD_SPHERE: -0.50
OD_AXIS: 73
OS_CYLINDER: -1.50

## 2020-06-30 ASSESSMENT — LID POSITION - DERMATOCHALASIS
OS_DERMATOCHALASIS: LUL 1+
OD_DERMATOCHALASIS: RUL 1+

## 2020-06-30 ASSESSMENT — SPHEQUIV_DERIVED
OS_SPHEQUIV: -1
OD_SPHEQUIV: -0.75

## 2020-07-30 ENCOUNTER — DOCTOR'S OFFICE (OUTPATIENT)
Dept: URBAN - METROPOLITAN AREA CLINIC 136 | Facility: CLINIC | Age: 70
Setting detail: OPHTHALMOLOGY
End: 2020-07-30
Payer: COMMERCIAL

## 2020-07-30 DIAGNOSIS — H35.3211: ICD-10-CM

## 2020-07-30 PROCEDURE — 67028 INJECTION EYE DRUG: CPT | Performed by: OPHTHALMOLOGY

## 2020-07-30 PROCEDURE — 92134 CPTRZ OPH DX IMG PST SGM RTA: CPT | Performed by: OPHTHALMOLOGY

## 2020-07-30 ASSESSMENT — VISUAL ACUITY
OS_BCVA: 20/30-1
OD_BCVA: CF 1FT

## 2020-07-30 ASSESSMENT — SPHEQUIV_DERIVED
OD_SPHEQUIV: -0.75
OS_SPHEQUIV: -1

## 2020-07-30 ASSESSMENT — REFRACTION_AUTOREFRACTION
OS_SPHERE: -0.25
OD_AXIS: 73
OD_CYLINDER: -0.50
OS_CYLINDER: -1.50
OS_AXIS: 89
OD_SPHERE: -0.50

## 2020-08-11 DIAGNOSIS — I25.10 ATHEROSCLEROTIC HEART DISEASE OF NATIVE CORONARY ARTERY WITHOUT ANGINA PECTORIS: ICD-10-CM

## 2020-08-11 DIAGNOSIS — N18.30 CHRONIC KIDNEY DISEASE, STAGE 3 (MODERATE): ICD-10-CM

## 2020-08-11 DIAGNOSIS — I10 ESSENTIAL (PRIMARY) HYPERTENSION: ICD-10-CM

## 2020-08-11 DIAGNOSIS — I12.9 HYPERTENSIVE CHRONIC KIDNEY DISEASE WITH STAGE 1 THROUGH STAGE 4 CHRONIC KIDNEY DISEASE, OR UNSPECIFIED CHRONIC KIDNEY DISEASE: ICD-10-CM

## 2020-08-11 DIAGNOSIS — I77.810 THORACIC AORTIC ECTASIA (HCC): ICD-10-CM

## 2020-08-11 DIAGNOSIS — I25.2 OLD MYOCARDIAL INFARCTION: ICD-10-CM

## 2020-09-02 ENCOUNTER — DOCTOR'S OFFICE (OUTPATIENT)
Dept: URBAN - METROPOLITAN AREA CLINIC 136 | Facility: CLINIC | Age: 70
Setting detail: OPHTHALMOLOGY
End: 2020-09-02
Payer: COMMERCIAL

## 2020-09-02 ENCOUNTER — RX ONLY (RX ONLY)
Age: 70
End: 2020-09-02

## 2020-09-02 VITALS — HEIGHT: 60 IN

## 2020-09-02 DIAGNOSIS — H35.3211: ICD-10-CM

## 2020-09-02 PROCEDURE — 92134 CPTRZ OPH DX IMG PST SGM RTA: CPT | Performed by: OPHTHALMOLOGY

## 2020-09-02 PROCEDURE — 67028 INJECTION EYE DRUG: CPT | Performed by: OPHTHALMOLOGY

## 2020-09-02 ASSESSMENT — CONFRONTATIONAL VISUAL FIELD TEST (CVF)
OD_FINDINGS: FULL
OS_FINDINGS: FULL

## 2020-09-02 ASSESSMENT — LID POSITION - DERMATOCHALASIS
OS_DERMATOCHALASIS: LUL 1+
OD_DERMATOCHALASIS: RUL 1+

## 2020-09-02 ASSESSMENT — REFRACTION_AUTOREFRACTION
OD_CYLINDER: -0.50
OD_AXIS: 73
OD_SPHERE: -0.50
OS_SPHERE: -0.25
OS_AXIS: 89
OS_CYLINDER: -1.50

## 2020-09-02 ASSESSMENT — VISUAL ACUITY
OS_BCVA: 20/30-1
OD_BCVA: 20/CF@3FT

## 2020-09-02 ASSESSMENT — SPHEQUIV_DERIVED
OS_SPHEQUIV: -1
OD_SPHEQUIV: -0.75

## 2020-09-08 ENCOUNTER — HOSPITAL ENCOUNTER (OUTPATIENT)
Dept: CT IMAGING | Facility: HOSPITAL | Age: 70
Discharge: HOME | End: 2020-09-08
Payer: MEDICARE

## 2020-09-08 DIAGNOSIS — I25.10 ATHEROSCLEROTIC HEART DISEASE OF NATIVE CORONARY ARTERY WITHOUT ANGINA PECTORIS: ICD-10-CM

## 2020-09-08 DIAGNOSIS — I10 ESSENTIAL (PRIMARY) HYPERTENSION: ICD-10-CM

## 2020-09-08 DIAGNOSIS — N18.30 CHRONIC KIDNEY DISEASE, STAGE 3 (MODERATE): ICD-10-CM

## 2020-09-08 DIAGNOSIS — I25.2 OLD MYOCARDIAL INFARCTION: ICD-10-CM

## 2020-09-08 DIAGNOSIS — I77.810 THORACIC AORTIC ECTASIA (HCC): ICD-10-CM

## 2020-09-08 DIAGNOSIS — I12.9 HYPERTENSIVE CHRONIC KIDNEY DISEASE WITH STAGE 1 THROUGH STAGE 4 CHRONIC KIDNEY DISEASE, OR UNSPECIFIED CHRONIC KIDNEY DISEASE: ICD-10-CM

## 2020-09-08 PROCEDURE — 71250 CT THORAX DX C-: CPT

## 2020-10-13 ENCOUNTER — RX ONLY (RX ONLY)
Age: 70
End: 2020-10-13

## 2020-10-13 ENCOUNTER — DOCTOR'S OFFICE (OUTPATIENT)
Dept: URBAN - NONMETROPOLITAN AREA CLINIC 1 | Facility: CLINIC | Age: 70
Setting detail: OPHTHALMOLOGY
End: 2020-10-13
Payer: COMMERCIAL

## 2020-10-13 VITALS — HEIGHT: 60 IN

## 2020-10-13 DIAGNOSIS — H35.3211: ICD-10-CM

## 2020-10-13 DIAGNOSIS — H35.379: ICD-10-CM

## 2020-10-13 DIAGNOSIS — H43.811: ICD-10-CM

## 2020-10-13 DIAGNOSIS — H40.013: ICD-10-CM

## 2020-10-13 DIAGNOSIS — H35.3124: ICD-10-CM

## 2020-10-13 PROCEDURE — 92134 CPTRZ OPH DX IMG PST SGM RTA: CPT | Performed by: OPHTHALMOLOGY

## 2020-10-13 PROCEDURE — 92014 COMPRE OPH EXAM EST PT 1/>: CPT | Performed by: OPHTHALMOLOGY

## 2020-10-13 ASSESSMENT — LID POSITION - DERMATOCHALASIS
OD_DERMATOCHALASIS: RUL 1+
OS_DERMATOCHALASIS: LUL 1+

## 2020-10-13 ASSESSMENT — CONFRONTATIONAL VISUAL FIELD TEST (CVF)
OS_FINDINGS: FULL
OD_FINDINGS: FULL

## 2020-10-15 ASSESSMENT — REFRACTION_AUTOREFRACTION
OS_AXIS: -1.26
OD_CYLINDER: -1.00
OD_AXIS: 79
OD_SPHERE: -0.25
OS_SPHERE: -0.75
OS_CYLINDER: -1.50

## 2020-10-15 ASSESSMENT — SPHEQUIV_DERIVED
OS_SPHEQUIV: -1.5
OD_SPHEQUIV: -0.75

## 2020-10-15 ASSESSMENT — KERATOMETRY
OD_K1POWER_DIOPTERS: 41.75
OD_AXISANGLE_DEGREES: 154
OD_K2POWER_DIOPTERS: 42.00
OS_AXISANGLE_DEGREES: 151
OS_K2POWER_DIOPTERS: 42.50
OS_K1POWER_DIOPTERS: 42.25

## 2020-10-15 ASSESSMENT — AXIALLENGTH_DERIVED
OD_AL: 24.5158
OS_AL: 24.6336

## 2020-10-15 ASSESSMENT — VISUAL ACUITY
OD_BCVA: CF@1FT
OS_BCVA: 20/50-1

## 2020-10-15 ASSESSMENT — REFRACTION_CURRENTRX
OD_OVR_VA: 20/
OS_OVR_VA: 20/

## 2020-10-30 ENCOUNTER — DOCTOR'S OFFICE (OUTPATIENT)
Dept: URBAN - NONMETROPOLITAN AREA CLINIC 1 | Facility: CLINIC | Age: 70
Setting detail: OPHTHALMOLOGY
End: 2020-10-30
Payer: COMMERCIAL

## 2020-10-30 DIAGNOSIS — H35.3211: ICD-10-CM

## 2020-10-30 PROCEDURE — SAMPLE SAMPLE MEDICATION: Performed by: OPHTHALMOLOGY

## 2020-10-30 PROCEDURE — 67028 INJECTION EYE DRUG: CPT | Performed by: OPHTHALMOLOGY

## 2020-10-30 ASSESSMENT — AXIALLENGTH_DERIVED
OS_AL: 24.6336
OD_AL: 24.5158

## 2020-10-30 ASSESSMENT — REFRACTION_AUTOREFRACTION
OS_SPHERE: -0.75
OS_AXIS: -1.26
OD_CYLINDER: -1.00
OS_CYLINDER: -1.50
OD_AXIS: 79
OD_SPHERE: -0.25

## 2020-10-30 ASSESSMENT — KERATOMETRY
OS_K1POWER_DIOPTERS: 42.25
OS_K2POWER_DIOPTERS: 42.50
OD_K2POWER_DIOPTERS: 42.00
OD_AXISANGLE_DEGREES: 154
OD_K1POWER_DIOPTERS: 41.75
OS_AXISANGLE_DEGREES: 151

## 2020-10-30 ASSESSMENT — SPHEQUIV_DERIVED
OD_SPHEQUIV: -0.75
OS_SPHEQUIV: -1.5

## 2020-10-30 ASSESSMENT — VISUAL ACUITY
OD_BCVA: 20/CFX1FT
OS_BCVA: 20/50-2

## 2020-12-04 ENCOUNTER — DOCTOR'S OFFICE (OUTPATIENT)
Dept: URBAN - NONMETROPOLITAN AREA CLINIC 1 | Facility: CLINIC | Age: 70
Setting detail: OPHTHALMOLOGY
End: 2020-12-04
Payer: COMMERCIAL

## 2020-12-04 VITALS — HEIGHT: 60 IN

## 2020-12-04 DIAGNOSIS — H35.3211: ICD-10-CM

## 2020-12-04 DIAGNOSIS — H43.811: ICD-10-CM

## 2020-12-04 DIAGNOSIS — H35.379: ICD-10-CM

## 2020-12-04 DIAGNOSIS — Z96.1: ICD-10-CM

## 2020-12-04 DIAGNOSIS — H35.3124: ICD-10-CM

## 2020-12-04 DIAGNOSIS — H40.013: ICD-10-CM

## 2020-12-04 PROCEDURE — 92014 COMPRE OPH EXAM EST PT 1/>: CPT | Performed by: OPHTHALMOLOGY

## 2020-12-04 PROCEDURE — 67028 INJECTION EYE DRUG: CPT | Performed by: OPHTHALMOLOGY

## 2020-12-04 PROCEDURE — 92134 CPTRZ OPH DX IMG PST SGM RTA: CPT | Performed by: OPHTHALMOLOGY

## 2020-12-04 ASSESSMENT — KERATOMETRY
OD_K1POWER_DIOPTERS: 41.75
OS_K2POWER_DIOPTERS: 42.50
OD_AXISANGLE_DEGREES: 161
OS_AXISANGLE_DEGREES: 168
OD_K2POWER_DIOPTERS: 42.50
OS_K1POWER_DIOPTERS: 41.75

## 2020-12-04 ASSESSMENT — AXIALLENGTH_DERIVED
OD_AL: 24.365
OS_AL: 24.6807

## 2020-12-04 ASSESSMENT — SPHEQUIV_DERIVED
OS_SPHEQUIV: -1.375
OD_SPHEQUIV: -0.625

## 2020-12-04 ASSESSMENT — REFRACTION_AUTOREFRACTION
OS_CYLINDER: -0.75
OD_SPHERE: 0.00
OD_CYLINDER: -1.25
OS_AXIS: 147
OD_AXIS: 096
OS_SPHERE: -1.00

## 2020-12-04 ASSESSMENT — LID POSITION - DERMATOCHALASIS
OD_DERMATOCHALASIS: RUL 1+
OS_DERMATOCHALASIS: LUL 1+

## 2020-12-04 ASSESSMENT — VISUAL ACUITY
OD_BCVA: 20/CFX1FT
OS_BCVA: 20/70

## 2020-12-04 ASSESSMENT — CONFRONTATIONAL VISUAL FIELD TEST (CVF)
OS_FINDINGS: FULL
OD_FINDINGS: FULL

## 2020-12-15 ENCOUNTER — DOCTOR'S OFFICE (OUTPATIENT)
Dept: URBAN - NONMETROPOLITAN AREA CLINIC 1 | Facility: CLINIC | Age: 70
Setting detail: OPHTHALMOLOGY
End: 2020-12-15
Payer: COMMERCIAL

## 2020-12-15 VITALS — HEIGHT: 60 IN

## 2020-12-15 DIAGNOSIS — H35.3124: ICD-10-CM

## 2020-12-15 DIAGNOSIS — H35.3211: ICD-10-CM

## 2020-12-15 DIAGNOSIS — H40.013: ICD-10-CM

## 2020-12-15 DIAGNOSIS — Z96.1: ICD-10-CM

## 2020-12-15 DIAGNOSIS — H43.811: ICD-10-CM

## 2020-12-15 DIAGNOSIS — H35.379: ICD-10-CM

## 2020-12-15 DIAGNOSIS — H11.31: ICD-10-CM

## 2020-12-15 PROCEDURE — 92083 EXTENDED VISUAL FIELD XM: CPT | Performed by: OPHTHALMOLOGY

## 2020-12-15 PROCEDURE — 92250 FUNDUS PHOTOGRAPHY W/I&R: CPT | Performed by: OPHTHALMOLOGY

## 2020-12-15 PROCEDURE — 92014 COMPRE OPH EXAM EST PT 1/>: CPT | Performed by: OPHTHALMOLOGY

## 2020-12-15 PROCEDURE — 76514 ECHO EXAM OF EYE THICKNESS: CPT | Performed by: OPHTHALMOLOGY

## 2020-12-15 ASSESSMENT — PACHYMETRY
OD_CT_CORRECTION: -1
OS_CT_UM: 568
OS_CT_CORRECTION: -1
OD_CT_UM: 558

## 2020-12-15 ASSESSMENT — LID POSITION - DERMATOCHALASIS
OS_DERMATOCHALASIS: LUL 1+
OD_DERMATOCHALASIS: RUL 1+

## 2020-12-15 ASSESSMENT — CONFRONTATIONAL VISUAL FIELD TEST (CVF)
OS_FINDINGS: FULL
OD_FINDINGS: FULL

## 2020-12-16 ASSESSMENT — AXIALLENGTH_DERIVED
OD_AL: 24.365
OS_AL: 23.6588

## 2020-12-16 ASSESSMENT — KERATOMETRY
OD_K1POWER_DIOPTERS: 41.75
OS_K1POWER_DIOPTERS: 41.75
OS_AXISANGLE_DEGREES: 168
OD_AXISANGLE_DEGREES: 161
OS_K2POWER_DIOPTERS: 42.50
OD_K2POWER_DIOPTERS: 42.50

## 2020-12-16 ASSESSMENT — SPHEQUIV_DERIVED
OS_SPHEQUIV: 1.125
OD_SPHEQUIV: -0.625

## 2020-12-16 ASSESSMENT — VISUAL ACUITY
OS_BCVA: 20/50-2
OD_BCVA: 20/CFX1FT

## 2020-12-16 ASSESSMENT — REFRACTION_AUTOREFRACTION
OD_CYLINDER: -1.75
OS_AXIS: 079
OS_CYLINDER: -0.25
OD_AXIS: 092
OS_SPHERE: +1.25
OD_SPHERE: +0.25

## 2021-01-05 ENCOUNTER — RX ONLY (RX ONLY)
Age: 71
End: 2021-01-05

## 2021-01-05 ENCOUNTER — DOCTOR'S OFFICE (OUTPATIENT)
Dept: URBAN - NONMETROPOLITAN AREA CLINIC 1 | Facility: CLINIC | Age: 71
Setting detail: OPHTHALMOLOGY
End: 2021-01-05
Payer: COMMERCIAL

## 2021-01-05 DIAGNOSIS — H35.3211: ICD-10-CM

## 2021-01-05 DIAGNOSIS — H40.013: ICD-10-CM

## 2021-01-05 DIAGNOSIS — H35.3124: ICD-10-CM

## 2021-01-05 PROBLEM — H11.31 SUBCONJUNCTIVAL HEMMORHAGE; RIGHT EYE: Status: RESOLVED | Noted: 2020-12-15 | Resolved: 2021-01-05

## 2021-01-05 PROCEDURE — 92134 CPTRZ OPH DX IMG PST SGM RTA: CPT | Performed by: OPHTHALMOLOGY

## 2021-01-05 PROCEDURE — 92014 COMPRE OPH EXAM EST PT 1/>: CPT | Performed by: OPHTHALMOLOGY

## 2021-01-05 PROCEDURE — 67028 INJECTION EYE DRUG: CPT | Performed by: OPHTHALMOLOGY

## 2021-01-05 ASSESSMENT — KERATOMETRY
OD_K2POWER_DIOPTERS: 41.75
OS_AXISANGLE_DEGREES: 006
OD_K1POWER_DIOPTERS: 41.50
OS_K1POWER_DIOPTERS: 42.25
OS_K2POWER_DIOPTERS: 43.00
OD_AXISANGLE_DEGREES: 154

## 2021-01-05 ASSESSMENT — AXIALLENGTH_DERIVED
OS_AL: 23.4756
OD_AL: 24.5625

## 2021-01-05 ASSESSMENT — REFRACTION_AUTOREFRACTION
OS_AXIS: 079
OD_SPHERE: +0.25
OD_CYLINDER: -1.75
OD_AXIS: 092
OS_CYLINDER: -0.25
OS_SPHERE: +1.25

## 2021-01-05 ASSESSMENT — SPHEQUIV_DERIVED
OD_SPHEQUIV: -0.625
OS_SPHEQUIV: 1.125

## 2021-01-05 ASSESSMENT — VISUAL ACUITY
OS_BCVA: 20/70-1
OD_BCVA: CFX2FT

## 2021-01-05 ASSESSMENT — LID POSITION - DERMATOCHALASIS
OS_DERMATOCHALASIS: LUL 1+
OD_DERMATOCHALASIS: RUL 1+

## 2021-01-05 ASSESSMENT — CONFRONTATIONAL VISUAL FIELD TEST (CVF)
OD_FINDINGS: FULL
OS_FINDINGS: FULL

## 2021-03-08 ENCOUNTER — DOCTOR'S OFFICE (OUTPATIENT)
Dept: URBAN - NONMETROPOLITAN AREA CLINIC 1 | Facility: CLINIC | Age: 71
Setting detail: OPHTHALMOLOGY
End: 2021-03-08
Payer: COMMERCIAL

## 2021-03-08 DIAGNOSIS — H40.013: ICD-10-CM

## 2021-03-08 PROCEDURE — 92020 GONIOSCOPY: CPT | Performed by: OPHTHALMOLOGY

## 2021-03-08 PROCEDURE — 92014 COMPRE OPH EXAM EST PT 1/>: CPT | Performed by: OPHTHALMOLOGY

## 2021-03-08 PROCEDURE — 92133 CPTRZD OPH DX IMG PST SGM ON: CPT | Performed by: OPHTHALMOLOGY

## 2021-03-08 ASSESSMENT — TONOMETRY
OD_IOP_MMHG: 13
OS_IOP_MMHG: 14

## 2021-03-08 ASSESSMENT — LID POSITION - DERMATOCHALASIS
OS_DERMATOCHALASIS: LUL 1+
OD_DERMATOCHALASIS: RUL 1+

## 2021-03-08 ASSESSMENT — PACHYMETRY
OS_CT_UM: 568
OS_CT_CORRECTION: -1
OD_CT_UM: 558
OD_CT_CORRECTION: -1

## 2021-03-08 ASSESSMENT — CONFRONTATIONAL VISUAL FIELD TEST (CVF)
OD_FINDINGS: FULL
OS_FINDINGS: FULL

## 2021-07-12 ENCOUNTER — DOCTOR'S OFFICE (OUTPATIENT)
Dept: URBAN - NONMETROPOLITAN AREA CLINIC 1 | Facility: CLINIC | Age: 71
Setting detail: OPHTHALMOLOGY
End: 2021-07-12
Payer: COMMERCIAL

## 2021-07-12 VITALS — HEIGHT: 60 IN

## 2021-07-12 DIAGNOSIS — H35.3211: ICD-10-CM

## 2021-07-12 DIAGNOSIS — H40.013: ICD-10-CM

## 2021-07-12 PROCEDURE — 92012 INTRM OPH EXAM EST PATIENT: CPT | Performed by: OPHTHALMOLOGY

## 2021-07-12 ASSESSMENT — PACHYMETRY
OD_CT_CORRECTION: -1
OS_CT_UM: 568
OD_CT_UM: 558
OS_CT_CORRECTION: -1

## 2021-07-12 ASSESSMENT — LID POSITION - DERMATOCHALASIS
OD_DERMATOCHALASIS: RUL 1+
OS_DERMATOCHALASIS: LUL 1+

## 2021-07-12 ASSESSMENT — TONOMETRY
OD_IOP_MMHG: 10
OS_IOP_MMHG: 11

## 2021-07-12 ASSESSMENT — CONFRONTATIONAL VISUAL FIELD TEST (CVF)
OS_FINDINGS: FULL
OD_FINDINGS: FULL

## 2021-07-12 ASSESSMENT — MACULA - RETINAL PIGMENT EPITHELIAL CHANGES: OD_RPE_CHANGES: 1+

## 2021-07-24 ASSESSMENT — KERATOMETRY
OS_K1POWER_DIOPTERS: 42.25
OD_K1POWER_DIOPTERS: 41.50
OD_AXISANGLE_DEGREES: 154
OS_AXISANGLE_DEGREES: 006
OS_K2POWER_DIOPTERS: 43.00
OD_K2POWER_DIOPTERS: 41.75

## 2021-07-24 ASSESSMENT — REFRACTION_AUTOREFRACTION
OD_SPHERE: +0.25
OD_CYLINDER: -1.75
OS_AXIS: 079
OD_AXIS: 092
OS_SPHERE: +1.25
OS_CYLINDER: -0.25

## 2021-07-24 ASSESSMENT — SPHEQUIV_DERIVED
OS_SPHEQUIV: 1.125
OD_SPHEQUIV: -0.625

## 2021-07-24 ASSESSMENT — VISUAL ACUITY
OD_BCVA: CFX2FT
OS_BCVA: 20/70

## 2021-07-24 ASSESSMENT — AXIALLENGTH_DERIVED
OS_AL: 23.4756
OD_AL: 24.5625

## 2021-07-25 ASSESSMENT — REFRACTION_AUTOREFRACTION
OS_AXIS: 079
OS_SPHERE: +1.25
OD_CYLINDER: -1.75
OS_CYLINDER: -0.25
OD_SPHERE: +0.25
OD_AXIS: 092

## 2021-07-25 ASSESSMENT — SPHEQUIV_DERIVED
OS_SPHEQUIV: 1.125
OD_SPHEQUIV: -0.625

## 2021-07-25 ASSESSMENT — VISUAL ACUITY
OS_BCVA: 20/40-1
OD_BCVA: CFX2FT

## 2021-07-25 ASSESSMENT — KERATOMETRY
OS_AXISANGLE_DEGREES: 006
OS_K1POWER_DIOPTERS: 42.25
OD_AXISANGLE_DEGREES: 154
OS_K2POWER_DIOPTERS: 43.00
OD_K1POWER_DIOPTERS: 41.50
OD_K2POWER_DIOPTERS: 41.75

## 2021-07-25 ASSESSMENT — AXIALLENGTH_DERIVED
OD_AL: 24.5625
OS_AL: 23.4756

## 2021-11-08 ENCOUNTER — DOCTOR'S OFFICE (OUTPATIENT)
Dept: URBAN - NONMETROPOLITAN AREA CLINIC 1 | Facility: CLINIC | Age: 71
Setting detail: OPHTHALMOLOGY
End: 2021-11-08
Payer: COMMERCIAL

## 2021-11-08 DIAGNOSIS — Z96.1: ICD-10-CM

## 2021-11-08 DIAGNOSIS — H35.3211: ICD-10-CM

## 2021-11-08 DIAGNOSIS — H35.3124: ICD-10-CM

## 2021-11-08 DIAGNOSIS — H40.013: ICD-10-CM

## 2021-11-08 PROCEDURE — 92012 INTRM OPH EXAM EST PATIENT: CPT | Performed by: OPHTHALMOLOGY

## 2021-11-08 PROCEDURE — 92133 CPTRZD OPH DX IMG PST SGM ON: CPT | Performed by: OPHTHALMOLOGY

## 2021-11-08 ASSESSMENT — REFRACTION_AUTOREFRACTION
OS_AXIS: 079
OD_SPHERE: +0.25
OD_CYLINDER: -1.75
OD_AXIS: 092
OS_CYLINDER: -0.25
OS_SPHERE: +1.25

## 2021-11-08 ASSESSMENT — KERATOMETRY
OD_K2POWER_DIOPTERS: 41.75
OD_K1POWER_DIOPTERS: 41.50
OS_AXISANGLE_DEGREES: 006
OS_K2POWER_DIOPTERS: 43.00
OD_AXISANGLE_DEGREES: 154
OS_K1POWER_DIOPTERS: 42.25

## 2021-11-08 ASSESSMENT — PACHYMETRY
OS_CT_CORRECTION: -1
OD_CT_UM: 558
OS_CT_UM: 568
OD_CT_CORRECTION: -1

## 2021-11-08 ASSESSMENT — AXIALLENGTH_DERIVED
OS_AL: 23.4756
OD_AL: 24.5625

## 2021-11-08 ASSESSMENT — CONFRONTATIONAL VISUAL FIELD TEST (CVF)
OS_COMMENTS: CENTRAL DEFECT
OD_FINDINGS: FULL

## 2021-11-08 ASSESSMENT — SPHEQUIV_DERIVED
OD_SPHEQUIV: -0.625
OS_SPHEQUIV: 1.125

## 2021-11-08 ASSESSMENT — TONOMETRY
OS_IOP_MMHG: 14
OD_IOP_MMHG: 14

## 2021-11-08 ASSESSMENT — VISUAL ACUITY
OS_BCVA: 20/60-2
OD_BCVA: CFX1FT

## 2021-11-08 ASSESSMENT — MACULA - RETINAL PIGMENT EPITHELIAL CHANGES: OD_RPE_CHANGES: 1+

## 2021-11-08 ASSESSMENT — LID POSITION - DERMATOCHALASIS
OS_DERMATOCHALASIS: LUL 1+
OD_DERMATOCHALASIS: RUL 1+

## 2022-06-10 ENCOUNTER — DOCTOR'S OFFICE (OUTPATIENT)
Dept: URBAN - NONMETROPOLITAN AREA CLINIC 1 | Facility: CLINIC | Age: 72
Setting detail: OPHTHALMOLOGY
End: 2022-06-10
Payer: COMMERCIAL

## 2022-06-10 DIAGNOSIS — Z96.1: ICD-10-CM

## 2022-06-10 DIAGNOSIS — H35.3124: ICD-10-CM

## 2022-06-10 DIAGNOSIS — H40.013: ICD-10-CM

## 2022-06-10 DIAGNOSIS — H35.3211: ICD-10-CM

## 2022-06-10 PROBLEM — H43.811 POSTERIOR VITREOUS DETACHMENT; RIGHT EYE: Status: ACTIVE | Noted: 2017-07-19

## 2022-06-10 PROBLEM — H35.379 EPIRETINAL MEMBRANE ; RIGHT EYE: Status: ACTIVE | Noted: 2018-05-29

## 2022-06-10 PROCEDURE — 99214 OFFICE O/P EST MOD 30 MIN: CPT | Performed by: OPHTHALMOLOGY

## 2022-06-10 PROCEDURE — 92083 EXTENDED VISUAL FIELD XM: CPT | Performed by: OPHTHALMOLOGY

## 2022-06-10 PROCEDURE — 76514 ECHO EXAM OF EYE THICKNESS: CPT | Performed by: OPHTHALMOLOGY

## 2022-06-10 PROCEDURE — 92134 CPTRZ OPH DX IMG PST SGM RTA: CPT | Performed by: OPHTHALMOLOGY

## 2022-06-10 ASSESSMENT — REFRACTION_AUTOREFRACTION
OS_AXIS: 020
OD_AXIS: 073
OS_CYLINDER: -1.00
OS_SPHERE: -1.25
OD_SPHERE: -0.75
OD_CYLINDER: -0.75

## 2022-06-10 ASSESSMENT — PACHYMETRY
OD_CT_UM: 558
OS_CT_CORRECTION: -1
OS_CT_UM: 568
OD_CT_CORRECTION: -1

## 2022-06-10 ASSESSMENT — KERATOMETRY
OS_K2POWER_DIOPTERS: 43.00
OD_K1POWER_DIOPTERS: 41.50
OD_AXISANGLE_DEGREES: 154
OS_AXISANGLE_DEGREES: 006
OD_K2POWER_DIOPTERS: 41.75
OS_K1POWER_DIOPTERS: 42.25

## 2022-06-10 ASSESSMENT — AXIALLENGTH_DERIVED
OD_AL: 24.7755
OS_AL: 24.6397

## 2022-06-10 ASSESSMENT — LID POSITION - DERMATOCHALASIS
OS_DERMATOCHALASIS: LUL 1+
OD_DERMATOCHALASIS: RUL 1+

## 2022-06-10 ASSESSMENT — MACULA - RETINAL PIGMENT EPITHELIAL CHANGES: OD_RPE_CHANGES: 1+

## 2022-06-10 ASSESSMENT — VISUAL ACUITY
OD_BCVA: CFX1FT
OS_BCVA: 20/60-1

## 2022-06-10 ASSESSMENT — SPHEQUIV_DERIVED
OS_SPHEQUIV: -1.75
OD_SPHEQUIV: -1.125

## 2022-12-13 ENCOUNTER — DOCTOR'S OFFICE (OUTPATIENT)
Dept: URBAN - NONMETROPOLITAN AREA CLINIC 1 | Facility: CLINIC | Age: 72
Setting detail: OPHTHALMOLOGY
End: 2022-12-13
Payer: COMMERCIAL

## 2022-12-13 DIAGNOSIS — H40.013: ICD-10-CM

## 2022-12-13 DIAGNOSIS — H35.3124: ICD-10-CM

## 2022-12-13 DIAGNOSIS — Z96.1: ICD-10-CM

## 2022-12-13 DIAGNOSIS — H35.3211: ICD-10-CM

## 2022-12-13 PROCEDURE — 92133 CPTRZD OPH DX IMG PST SGM ON: CPT | Performed by: OPHTHALMOLOGY

## 2022-12-13 PROCEDURE — 99213 OFFICE O/P EST LOW 20 MIN: CPT | Performed by: OPHTHALMOLOGY

## 2022-12-13 ASSESSMENT — KERATOMETRY
OD_K2POWER_DIOPTERS: 41.75
OS_K2POWER_DIOPTERS: 43.00
OD_K1POWER_DIOPTERS: 41.50
OS_K1POWER_DIOPTERS: 42.25
OS_AXISANGLE_DEGREES: 006
OD_AXISANGLE_DEGREES: 154

## 2022-12-13 ASSESSMENT — LID POSITION - DERMATOCHALASIS
OS_DERMATOCHALASIS: LUL 1+
OD_DERMATOCHALASIS: RUL 1+

## 2022-12-13 ASSESSMENT — VISUAL ACUITY
OD_BCVA: 20/400
OS_BCVA: 20/60+1

## 2022-12-13 ASSESSMENT — PACHYMETRY
OD_CT_CORRECTION: -1
OD_CT_UM: 558
OS_CT_UM: 568
OS_CT_CORRECTION: -1

## 2022-12-13 ASSESSMENT — REFRACTION_CURRENTRX
OD_SPHERE: +2.50
OS_SPHERE: +2.50
OD_OVR_VA: 20/
OS_OVR_VA: 20/

## 2022-12-13 ASSESSMENT — REFRACTION_AUTOREFRACTION
OD_SPHERE: -1.25
OS_AXIS: 064
OS_SPHERE: -1.00
OS_CYLINDER: -0.50
OD_CYLINDER: -0.75
OD_AXIS: 076

## 2022-12-13 ASSESSMENT — SPHEQUIV_DERIVED
OS_SPHEQUIV: -1.25
OD_SPHEQUIV: -1.625

## 2022-12-13 ASSESSMENT — TONOMETRY
OS_IOP_MMHG: 15
OD_IOP_MMHG: 14

## 2022-12-13 ASSESSMENT — AXIALLENGTH_DERIVED
OD_AL: 24.9922
OS_AL: 24.429

## 2022-12-13 ASSESSMENT — CONFRONTATIONAL VISUAL FIELD TEST (CVF)
OS_COMMENTS: CENTRAL DEFECT
OD_FINDINGS: FULL

## 2023-05-25 ENCOUNTER — HOSPITAL ENCOUNTER (OUTPATIENT)
Dept: NON INVASIVE DIAGNOSTICS | Facility: HOSPITAL | Age: 73
Discharge: HOME | End: 2023-05-25

## 2023-05-25 DIAGNOSIS — R60.0 LOCALIZED EDEMA: ICD-10-CM

## 2023-05-30 ENCOUNTER — HOSPITAL ENCOUNTER (OUTPATIENT)
Dept: CT IMAGING | Facility: HOSPITAL | Age: 73
Discharge: HOME | End: 2023-05-30

## 2023-05-30 DIAGNOSIS — J84.10 PULMONARY FIBROSIS, UNSPECIFIED (HCC): ICD-10-CM

## 2023-05-30 DIAGNOSIS — I77.810 THORACIC AORTIC ECTASIA (HCC): ICD-10-CM

## 2023-07-19 ENCOUNTER — DOCTOR'S OFFICE (OUTPATIENT)
Dept: URBAN - NONMETROPOLITAN AREA CLINIC 1 | Facility: CLINIC | Age: 73
Setting detail: OPHTHALMOLOGY
End: 2023-07-19
Payer: COMMERCIAL

## 2023-07-19 DIAGNOSIS — H40.013: ICD-10-CM

## 2023-07-19 DIAGNOSIS — Z96.1: ICD-10-CM

## 2023-07-19 DIAGNOSIS — H35.3211: ICD-10-CM

## 2023-07-19 DIAGNOSIS — H35.3124: ICD-10-CM

## 2023-07-19 PROCEDURE — 92083 EXTENDED VISUAL FIELD XM: CPT | Performed by: OPHTHALMOLOGY

## 2023-07-19 PROCEDURE — 92012 INTRM OPH EXAM EST PATIENT: CPT | Performed by: OPHTHALMOLOGY

## 2023-07-19 PROCEDURE — 76514 ECHO EXAM OF EYE THICKNESS: CPT | Performed by: OPHTHALMOLOGY

## 2023-07-19 PROCEDURE — 92134 CPTRZ OPH DX IMG PST SGM RTA: CPT | Performed by: OPHTHALMOLOGY

## 2023-07-19 ASSESSMENT — PACHYMETRY
OS_CT_UM: 568
OD_CT_UM: 558
OS_CT_CORRECTION: -1
OD_CT_CORRECTION: -1

## 2023-07-19 ASSESSMENT — REFRACTION_CURRENTRX
OD_OVR_VA: 20/
OS_OVR_VA: 20/
OS_SPHERE: +2.50
OD_SPHERE: +2.50

## 2023-07-19 ASSESSMENT — REFRACTION_AUTOREFRACTION
OS_CYLINDER: -1.00
OD_AXIS: 079
OS_SPHERE: -1.00
OD_SPHERE: -0.25
OS_AXIS: 091
OD_CYLINDER: -1.00

## 2023-07-19 ASSESSMENT — SPHEQUIV_DERIVED
OD_SPHEQUIV: -0.75
OS_SPHEQUIV: -1.5

## 2023-07-19 ASSESSMENT — KERATOMETRY
OD_K1POWER_DIOPTERS: 41.50
OD_AXISANGLE_DEGREES: 154
OS_K1POWER_DIOPTERS: 42.25
OS_K2POWER_DIOPTERS: 43.00
OD_K2POWER_DIOPTERS: 41.75
OS_AXISANGLE_DEGREES: 006

## 2023-07-19 ASSESSMENT — VISUAL ACUITY
OS_BCVA: 20/60+2
OD_BCVA: 20/CF @ 3FT

## 2023-07-19 ASSESSMENT — TONOMETRY
OD_IOP_MMHG: 14
OS_IOP_MMHG: 12

## 2023-07-19 ASSESSMENT — CONFRONTATIONAL VISUAL FIELD TEST (CVF)
OD_FINDINGS: FULL
OS_FINDINGS: FULL

## 2023-07-19 ASSESSMENT — AXIALLENGTH_DERIVED
OS_AL: 24.5339
OD_AL: 24.6154

## 2023-07-19 ASSESSMENT — MACULA - RETINAL PIGMENT EPITHELIAL CHANGES: OD_RPE_CHANGES: 1+

## 2023-08-18 ENCOUNTER — HOSPITAL ENCOUNTER (OUTPATIENT)
Dept: NON INVASIVE DIAGNOSTICS | Facility: HOSPITAL | Age: 73
Discharge: HOME | End: 2023-08-18
Attending: INTERNAL MEDICINE
Payer: MEDICARE

## 2023-08-18 VITALS
SYSTOLIC BLOOD PRESSURE: 140 MMHG | DIASTOLIC BLOOD PRESSURE: 70 MMHG | WEIGHT: 270 LBS | HEART RATE: 70 BPM | BODY MASS INDEX: 36.57 KG/M2 | HEIGHT: 72 IN

## 2023-08-18 DIAGNOSIS — I26.01: ICD-10-CM

## 2023-08-18 LAB
AORTIC ROOT: 4.2 CM
APICAL FOUR CHAMBER EJECTION FRACTION: 81 %
FRACTIONAL SHORTENING: 46 (ref 28–44)
INTERVENTRICULAR SEPTUM IN DIASTOLE (PARASTERNAL SHORT AXIS VIEW): 1.1 CM
INTERVENTRICULAR SEPTUM: 1.1 CM (ref 0.6–1.1)
LEFT ATRIUM SIZE: 4.1 CM
LEFT INTERNAL DIMENSION IN SYSTOLE: 2.6 CM (ref 2.1–4)
LEFT VENTRICLE DIASTOLIC VOLUME (MOD BIPLANE): 81 ML
LEFT VENTRICLE SYSTOLIC VOLUME (MOD BIPLANE): 19 ML
LEFT VENTRICULAR INTERNAL DIMENSION IN DIASTOLE: 4.8 CM (ref 3.5–6)
LEFT VENTRICULAR POSTERIOR WALL IN END DIASTOLE: 1.1 CM
LEFT VENTRICULAR STROKE VOLUME: 81 ML
LV EF: 76 %
LVSV (TEICH): 81 ML
RIGHT VENTRICLE ID DIMENSION: 5.3 CM
SL CV LV EF: 69
SL CV PED ECHO LEFT VENTRICLE DIASTOLIC VOLUME (MOD BIPLANE) 2D: 106 ML
SL CV PED ECHO LEFT VENTRICLE SYSTOLIC VOLUME (MOD BIPLANE) 2D: 25 ML
TRICUSPID ANNULAR PLANE SYSTOLIC EXCURSION: 2.3 CM

## 2023-08-18 PROCEDURE — 93308 TTE F-UP OR LMTD: CPT

## 2023-08-18 PROCEDURE — 93321 DOPPLER ECHO F-UP/LMTD STD: CPT

## 2023-08-18 PROCEDURE — 93325 DOPPLER ECHO COLOR FLOW MAPG: CPT | Performed by: INTERNAL MEDICINE

## 2023-08-18 PROCEDURE — 93325 DOPPLER ECHO COLOR FLOW MAPG: CPT

## 2023-08-18 PROCEDURE — 93321 DOPPLER ECHO F-UP/LMTD STD: CPT | Performed by: INTERNAL MEDICINE

## 2023-08-18 PROCEDURE — 93308 TTE F-UP OR LMTD: CPT | Performed by: INTERNAL MEDICINE

## 2023-10-11 ENCOUNTER — DOCTOR'S OFFICE (OUTPATIENT)
Dept: URBAN - NONMETROPOLITAN AREA CLINIC 1 | Facility: CLINIC | Age: 73
Setting detail: OPHTHALMOLOGY
End: 2023-10-11
Payer: COMMERCIAL

## 2023-10-11 DIAGNOSIS — Z96.1: ICD-10-CM

## 2023-10-11 DIAGNOSIS — H35.3124: ICD-10-CM

## 2023-10-11 DIAGNOSIS — H35.3211: ICD-10-CM

## 2023-10-11 DIAGNOSIS — H40.013: ICD-10-CM

## 2023-10-11 DIAGNOSIS — H35.379: ICD-10-CM

## 2023-10-11 PROCEDURE — 92133 CPTRZD OPH DX IMG PST SGM ON: CPT | Performed by: OPHTHALMOLOGY

## 2023-10-11 PROCEDURE — 99213 OFFICE O/P EST LOW 20 MIN: CPT | Performed by: OPHTHALMOLOGY

## 2023-10-11 ASSESSMENT — CONFRONTATIONAL VISUAL FIELD TEST (CVF)
OD_FINDINGS: FULL
OS_FINDINGS: FULL

## 2023-10-11 ASSESSMENT — AXIALLENGTH_DERIVED
OD_AL: 24.6154
OS_AL: 24.5339

## 2023-10-11 ASSESSMENT — REFRACTION_CURRENTRX
OD_OVR_VA: 20/
OS_SPHERE: +2.50
OD_SPHERE: +2.50
OS_OVR_VA: 20/

## 2023-10-11 ASSESSMENT — KERATOMETRY
OD_K2POWER_DIOPTERS: 41.75
OS_K1POWER_DIOPTERS: 42.25
OS_AXISANGLE_DEGREES: 006
OD_AXISANGLE_DEGREES: 154
OD_K1POWER_DIOPTERS: 41.50
OS_K2POWER_DIOPTERS: 43.00

## 2023-10-11 ASSESSMENT — MACULA - RETINAL PIGMENT EPITHELIAL CHANGES: OD_RPE_CHANGES: 1+

## 2023-10-11 ASSESSMENT — SPHEQUIV_DERIVED
OS_SPHEQUIV: -1.5
OD_SPHEQUIV: -0.75

## 2023-10-11 ASSESSMENT — TONOMETRY
OS_IOP_MMHG: 14
OD_IOP_MMHG: 12

## 2023-10-11 ASSESSMENT — LID POSITION - DERMATOCHALASIS
OD_DERMATOCHALASIS: RUL 1+
OS_DERMATOCHALASIS: LUL 1+

## 2023-10-11 ASSESSMENT — REFRACTION_AUTOREFRACTION
OS_AXIS: 091
OS_CYLINDER: -1.00
OD_CYLINDER: -1.00
OS_SPHERE: -1.00
OD_SPHERE: -0.25
OD_AXIS: 079

## 2023-10-11 ASSESSMENT — PACHYMETRY
OS_CT_UM: 568
OS_CT_CORRECTION: -1
OD_CT_UM: 558
OD_CT_CORRECTION: -1

## 2023-10-11 ASSESSMENT — VISUAL ACUITY
OD_BCVA: 20/CF @ 3FT
OS_BCVA: 20/50+2

## 2023-11-29 ENCOUNTER — HOSPITAL ENCOUNTER (OUTPATIENT)
Dept: CT IMAGING | Facility: HOSPITAL | Age: 73
Discharge: HOME | End: 2023-11-29
Payer: MEDICARE

## 2023-11-29 DIAGNOSIS — Z86.711 PERSONAL HISTORY OF PULMONARY EMBOLISM: ICD-10-CM

## 2023-11-29 DIAGNOSIS — R93.89 ABNORMAL FINDINGS ON DIAGNOSTIC IMAGING OF OTHER SPECIFIED BODY STRUCTURES: ICD-10-CM

## 2023-11-29 PROCEDURE — 71250 CT THORAX DX C-: CPT

## 2024-01-11 RX ORDER — CHLORAL HYDRATE 500 MG
1000 CAPSULE ORAL DAILY
COMMUNITY

## 2024-01-11 RX ORDER — ANTIOX #8/OM3/DHA/EPA/LUT/ZEAX 250-2.5 MG
1 CAPSULE ORAL DAILY
COMMUNITY

## 2024-01-11 RX ORDER — ATORVASTATIN CALCIUM 80 MG/1
80 TABLET, FILM COATED ORAL
COMMUNITY
Start: 2023-09-22

## 2024-01-11 RX ORDER — LISINOPRIL 10 MG/1
10 TABLET ORAL DAILY
COMMUNITY
Start: 2023-08-15

## 2024-01-11 RX ORDER — LANOLIN ALCOHOL/MO/W.PET/CERES
400 CREAM (GRAM) TOPICAL DAILY
COMMUNITY
Start: 2023-07-27

## 2024-01-11 RX ORDER — METOPROLOL SUCCINATE 100 MG/1
1 TABLET, EXTENDED RELEASE ORAL EVERY MORNING
COMMUNITY
Start: 2023-07-27

## 2024-01-11 RX ORDER — ASPIRIN 81 MG/1
81 TABLET ORAL DAILY
COMMUNITY

## 2024-01-17 ENCOUNTER — CONSULT (OUTPATIENT)
Dept: PULMONOLOGY | Facility: CLINIC | Age: 74
End: 2024-01-17
Payer: MEDICARE

## 2024-01-17 VITALS
WEIGHT: 272 LBS | SYSTOLIC BLOOD PRESSURE: 118 MMHG | DIASTOLIC BLOOD PRESSURE: 72 MMHG | OXYGEN SATURATION: 96 % | HEIGHT: 72 IN | HEART RATE: 71 BPM | BODY MASS INDEX: 36.84 KG/M2

## 2024-01-17 DIAGNOSIS — I26.99 PULMONARY EMBOLISM, UNSPECIFIED CHRONICITY, UNSPECIFIED PULMONARY EMBOLISM TYPE, UNSPECIFIED WHETHER ACUTE COR PULMONALE PRESENT (HCC): ICD-10-CM

## 2024-01-17 DIAGNOSIS — R93.89 ABNORMAL FINDINGS ON DIAGNOSTIC IMAGING OF OTHER SPECIFIED BODY STRUCTURES: ICD-10-CM

## 2024-01-17 DIAGNOSIS — R91.8 GROUND GLASS OPACITY PRESENT ON IMAGING OF LUNG: Primary | ICD-10-CM

## 2024-01-17 PROCEDURE — 99203 OFFICE O/P NEW LOW 30 MIN: CPT | Performed by: INTERNAL MEDICINE

## 2024-01-17 NOTE — PROGRESS NOTES
Pulmonary Consultation   Gene TEAGAN Domingo 73 y.o. male MRN: [id number]  1/17/2024      Assessment:  Abnormal CT chest  RUL GGO noted on CT chest in 11/2023  Seem to be decreasing in size from September  Consistent with the same area that had a large perfusion defect on VQ scan in April  Suspect resolving pulmonary infarct  No significant risk factors for malignancy, lifelong non-smoker  Also noted mild subpleural reticulation at the bases/seems to be more of a atelectatic changes rather than ILD    Plan:  Follow-up noncontrast CT chest in 6 months from last      Acute pulmonary embolism  Likely unprovoked/other risk factors is morbid obesity  Continue apixaban/needs lifelong therapy    Return in about 19 weeks (around 5/29/2024).        History of Present Illness     New Consult for: RUL GGO      Background  73 y.o. male with a h/o CAD, PCI, HTN, thoracic aortic HTZ, macular degeneration, CKD 3, DVT/PE    Hospitalized to [facility] in 4/2023 for dyspnea started few days prior/was progressively worse.  A VQ scan showed a large defect at the RUL area, started on heparin infusion then transition to apixaban.  CT chest at that time showed mixed RUL GGO/reticular changes/stable other pulmonary nodules.  Those findings were improving on repeat imaging in 11/2023.    Seen by hematology/oncology, considered unprovoked PE, negative hereditary risk factors, advised for anticoagulation/Eliquis indefinitely.    Presented today accompanied by his wife    Reports improvement of the respiratory symptoms since discharge in May.  At baseline, able to walk on a surface level long distance, climb 1 flight of stairs without stop.  No prior history of asthma/COPD, or chronic bronchitis.  Lifelong non-smoker, nonalcoholic.  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        Studies:  Imaging and other studies: I have personally  "reviewed pertinent films in PACS  CT chest 9/8/2020-multiple nodules largest 4 mm at the RML.    CT chest 5/30/2023-RUL mixed reticular/GGO changes.  Multiple nodules largest 4 mm at the RML.  Calcified granuloma at the RML.    CT chest 11/29/2023-decrease of reticular/GGO's at the RUL.  Stable RUL 2 mm nodule.  RML calcified granuloma.  RLL 4 mm nodule stable.    VQ scan 4/16/2023-large perfusion defect at the RUL.  Multiple small subsegmental defects in both lungs.      Lower extremity venous duplex 4/16/2023-occlusive thrombus at the left posterior tibial/peroneal veins.    Pulmonary function testing:       EKG, Pathology, and Other Studies: I have personally reviewed pertinent reports.          Historical Information   History reviewed. No pertinent past medical history.  History reviewed. No pertinent surgical history.  History reviewed. No pertinent family history.      Medications/Allergies: Reviewed    Vitals: Blood pressure 118/72, pulse 71, height 6' (1.829 m), weight 123 kg (272 lb), SpO2 96%. Body mass index is 36.89 kg/m². Oxygen Therapy  SpO2: 96 %      Physical Exam  Body mass index is 36.89 kg/m².   Gen: not in acute distress, obese  Neck/Eyes: supple,  PERRL  Ear: normal appearance, no significant hearing impairment  Nose:  normal nasal mucosa, no drainage  Mouth:  unremarkable/normal appearance of lips, teeth and gums  Oropharynx: mucosa is moist, no focal lesions or erythema  Salivary glands: soft nontender  Chest: normal respiratory efforts, clear breath sounds bilaterally  CV: RRR, no murmurs appreciated, no edema  Abdomen: soft, non tender  Extremities:  No observed deformity   Skin: unremarkable  Neuro: AAO X3, no focal motor deficit         Labs:  No results found for: \"WBC\", \"HGB\", \"HCT\", \"MCV\", \"PLT\"  No results found for: \"GLUCOSE\", \"CALCIUM\", \"NA\", \"K\", \"CO2\", \"CL\", \"BUN\", \"CREATININE\"  No results found for: \"IGE\"  No results found for: \"ALT\", \"AST\", \"GGT\", \"ALKPHOS\", " "\"BILITOT\"        Portions of the record may have been created with voice recognition software.  Occasional wrong word or \"sound a like\" substitutions may have occurred due to the inherent limitations of voice recognition software.  Read the chart carefully and recognize, using context, where substitutions have occurred    Kailash Ortega M.D.  [facility]  "

## 2024-04-12 ENCOUNTER — DOCTOR'S OFFICE (OUTPATIENT)
Dept: URBAN - NONMETROPOLITAN AREA CLINIC 1 | Facility: CLINIC | Age: 74
Setting detail: OPHTHALMOLOGY
End: 2024-04-12
Payer: MEDICARE

## 2024-04-12 DIAGNOSIS — H35.3211: ICD-10-CM

## 2024-04-12 DIAGNOSIS — H40.013: ICD-10-CM

## 2024-04-12 DIAGNOSIS — H35.3124: ICD-10-CM

## 2024-04-12 PROCEDURE — 92014 COMPRE OPH EXAM EST PT 1/>: CPT | Performed by: OPHTHALMOLOGY

## 2024-04-12 PROCEDURE — 76514 ECHO EXAM OF EYE THICKNESS: CPT | Performed by: OPHTHALMOLOGY

## 2024-04-12 PROCEDURE — 92083 EXTENDED VISUAL FIELD XM: CPT | Performed by: OPHTHALMOLOGY

## 2024-04-12 PROCEDURE — 92134 CPTRZ OPH DX IMG PST SGM RTA: CPT | Performed by: OPHTHALMOLOGY

## 2024-04-12 ASSESSMENT — LID POSITION - DERMATOCHALASIS
OD_DERMATOCHALASIS: RUL 1+
OS_DERMATOCHALASIS: LUL 1+

## 2024-04-25 ENCOUNTER — DOCTOR'S OFFICE (OUTPATIENT)
Dept: URBAN - NONMETROPOLITAN AREA CLINIC 1 | Facility: CLINIC | Age: 74
Setting detail: OPHTHALMOLOGY
End: 2024-04-25
Payer: MEDICARE

## 2024-04-25 DIAGNOSIS — H35.371: ICD-10-CM

## 2024-04-25 DIAGNOSIS — H35.3211: ICD-10-CM

## 2024-04-25 DIAGNOSIS — H35.051: ICD-10-CM

## 2024-04-25 DIAGNOSIS — H35.3124: ICD-10-CM

## 2024-04-25 PROCEDURE — 92235 FLUORESCEIN ANGRPH MLTIFRAME: CPT | Performed by: OPHTHALMOLOGY

## 2024-04-25 PROCEDURE — 92250 FUNDUS PHOTOGRAPHY W/I&R: CPT | Performed by: OPHTHALMOLOGY

## 2024-04-25 PROCEDURE — 99214 OFFICE O/P EST MOD 30 MIN: CPT | Performed by: OPHTHALMOLOGY

## 2024-04-25 ASSESSMENT — LID POSITION - DERMATOCHALASIS
OS_DERMATOCHALASIS: LUL 1+
OD_DERMATOCHALASIS: RUL 1+

## 2024-05-14 ENCOUNTER — HOSPITAL ENCOUNTER (OUTPATIENT)
Dept: CT IMAGING | Facility: HOSPITAL | Age: 74
Discharge: HOME | End: 2024-05-14
Attending: INTERNAL MEDICINE
Payer: MEDICARE

## 2024-05-14 DIAGNOSIS — R91.8 GROUND GLASS OPACITY PRESENT ON IMAGING OF LUNG: ICD-10-CM

## 2024-05-14 PROCEDURE — G1004 CDSM NDSC: HCPCS

## 2024-05-14 PROCEDURE — 71250 CT THORAX DX C-: CPT

## 2024-05-19 NOTE — PROGRESS NOTES
"Pulmonary Follow Up Note   Flaco Domingo 73 y.o. male MRN: [id number]  5/20/2024    Assessment:  Abnormal CT chest  RUL GGO noted on CT chest in 11/2023  Seem to be decreasing in size between 9/20/2023 and 11/20/2023  Repeat CT chest this month showed stable or slightly decreasing RUL GGOs  Likely resolving infarcts/evolving to scar    Plan:  Follow up CT chest in 6 months        Acute pulmonary embolism  Likely unprovoked  Other risk factors includes morbid obesity  Treated as an inpatient/noted on VQ scan on the right upper lobe with a perfusion defect  Resolved symptoms with treatment  Continue apixaban/lifelong therapy    Return in about 6 months (around 11/20/2024).    History of Present Illness     Follow up for: Abnormal CT/pulmonary embolism    Background:  As per initial consult note    \"73 y.o. male with a h/o CAD, PCI, HTN, thoracic aortic aneurysm macular degeneration, CKD 3, DVT/PE     Hospitalized to [facility] in 4/2023 for dyspnea started few days prior/was progressively worse.  A VQ scan showed a large defect at the RUL area, started on heparin infusion then transition to apixaban.  CT chest at that time showed mixed RUL GGO/reticular changes/stable other pulmonary nodules.  Those findings were improving on repeat imaging in 11/2023.     Seen by hematology/oncology, considered unprovoked PE, negative hereditary risk factors, advised for anticoagulation/Eliquis indefinitely.       Reports improvement of the respiratory symptoms since discharge in May.  At baseline, able to walk on a surface level long distance, climb 1 flight of stairs without stop.  No prior history of asthma/COPD, or chronic bronchitis.  Lifelong non-smoker, nonalcoholic.  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      Interval History  Since last seen, no major changes.  Continues to be active/independent " at baseline.  No recurrence of dyspnea on exertion, no cough, wheezing or chest congestion.  Still using Eliquis, no intolerance or symptoms/signs of bleeding.      Review of Systems  As per hpi, all other systems reviewed and were negative       Studies:  Imaging and other studies: I have personally reviewed pertinent films in PACS  CT chest 9/8/2020-multiple nodules largest 4 mm at the RML.     CT chest 5/30/2023-RUL mixed reticular/GGO changes.  Multiple nodules largest 4 mm at the RML.  Calcified granuloma at the RML.     CT chest 11/29/2023-decrease of reticular/GGO's at the RUL.  Stable RUL 2 mm nodule.  RML calcified granuloma.  RLL 4 mm nodule stable.     VQ scan 4/16/2023-large perfusion defect at the RUL.  Multiple small subsegmental defects in both lungs.        Lower extremity venous duplex 4/16/2023-occlusive thrombus at the left posterior tibial/peroneal veins.     Pulmonary function testing:         EKG, Pathology, and Other Studies: I have personally reviewed pertinent reports.         Past medical, surgical, social and family histories reviewed.      Medications/Allergies: Reviewed      Vitals: Blood pressure 154/96, pulse 56, temperature (!) 97.1 °F (36.2 °C), weight 122 kg (269 lb 6.4 oz), SpO2 98%. Body mass index is 36.54 kg/m². Oxygen Therapy  SpO2: 98 %      Physical Exam  Body mass index is 36.54 kg/m².   Gen: not in acute distress, obese  Neck/Eyes: supple, no adenopathy, PERRL  Ear: normal appearance, no significant hearing impairment  Nose:  normal nasal mucosa, no drainage  Mouth:  unremarkable/normal appearance of lips, teeth and gums  Oropharynx: mucosa is moist, no focal lesions or erythema  Salivary glands: soft nontender  Chest: normal respiratory efforts, clear breath sounds bilaterally  CV: RRR, no accentuated S2, no murmurs appreciated, no edema  Abdomen: soft, non tender  Extremities:  No observed deformity   Skin: unremarkable  Neuro: AAO X3, no focal motor  "deficit        Labs:  No results found for: \"WBC\", \"HGB\", \"HCT\", \"MCV\", \"PLT\"  Lab Results   Component Value Date    CALCIUM 9.1 11/24/2023    K 5.1 11/24/2023    CO2 26 11/24/2023     11/24/2023    BUN 22 (H) 11/24/2023    CREATININE 1.7 (H) 11/24/2023     No results found for: \"IGE\"  Lab Results   Component Value Date    ALT 22 11/24/2023    AST 21 11/24/2023    ALKPHOS 61 11/24/2023           Portions of the record may have been created with voice recognition software.  Occasional wrong word or \"sound a like\" substitutions may have occurred due to the inherent limitations of voice recognition software.  Read the chart carefully and recognize, using context, where substitutions have occurred    Kailash Ortega M.D.  [facility]  "

## 2024-05-20 ENCOUNTER — OFFICE VISIT (OUTPATIENT)
Dept: PULMONOLOGY | Facility: CLINIC | Age: 74
End: 2024-05-20
Payer: MEDICARE

## 2024-05-20 VITALS
TEMPERATURE: 97.1 F | WEIGHT: 269.4 LBS | OXYGEN SATURATION: 98 % | BODY MASS INDEX: 36.54 KG/M2 | HEART RATE: 56 BPM | SYSTOLIC BLOOD PRESSURE: 154 MMHG | DIASTOLIC BLOOD PRESSURE: 96 MMHG

## 2024-05-20 DIAGNOSIS — R91.8 GROUND GLASS OPACITY PRESENT ON IMAGING OF LUNG: ICD-10-CM

## 2024-05-20 DIAGNOSIS — I26.99 PULMONARY EMBOLISM, UNSPECIFIED CHRONICITY, UNSPECIFIED PULMONARY EMBOLISM TYPE, UNSPECIFIED WHETHER ACUTE COR PULMONALE PRESENT (HCC): ICD-10-CM

## 2024-05-20 DIAGNOSIS — R93.89 ABNORMAL FINDINGS ON DIAGNOSTIC IMAGING OF OTHER SPECIFIED BODY STRUCTURES: Primary | ICD-10-CM

## 2024-05-20 PROCEDURE — 99214 OFFICE O/P EST MOD 30 MIN: CPT | Performed by: INTERNAL MEDICINE

## 2024-05-22 ENCOUNTER — TELEPHONE (OUTPATIENT)
Dept: PULMONOLOGY | Facility: CLINIC | Age: 74
End: 2024-05-22

## 2024-05-23 ENCOUNTER — DOCTOR'S OFFICE (OUTPATIENT)
Dept: URBAN - NONMETROPOLITAN AREA CLINIC 1 | Facility: CLINIC | Age: 74
Setting detail: OPHTHALMOLOGY
End: 2024-05-23
Payer: MEDICARE

## 2024-05-23 DIAGNOSIS — H43.811: ICD-10-CM

## 2024-05-23 DIAGNOSIS — H35.3211: ICD-10-CM

## 2024-05-23 DIAGNOSIS — H35.3124: ICD-10-CM

## 2024-05-23 DIAGNOSIS — H35.371: ICD-10-CM

## 2024-05-23 PROCEDURE — 99213 OFFICE O/P EST LOW 20 MIN: CPT | Mod: 25 | Performed by: OPHTHALMOLOGY

## 2024-05-23 PROCEDURE — 92134 CPTRZ OPH DX IMG PST SGM RTA: CPT | Performed by: OPHTHALMOLOGY

## 2024-05-23 PROCEDURE — 67028 INJECTION EYE DRUG: CPT | Mod: RT | Performed by: OPHTHALMOLOGY

## 2024-05-23 ASSESSMENT — LID POSITION - DERMATOCHALASIS
OD_DERMATOCHALASIS: RUL 1+
OS_DERMATOCHALASIS: LUL 1+

## 2024-05-23 ASSESSMENT — CONFRONTATIONAL VISUAL FIELD TEST (CVF)
OD_FINDINGS: FULL
OS_FINDINGS: FULL

## 2024-05-24 NOTE — TELEPHONE ENCOUNTER
2nd attempt: Call to patient, no answer no machine. Patient does not have access to patient portal. Will attempt again.

## 2024-06-04 ENCOUNTER — TELEPHONE (OUTPATIENT)
Dept: UROLOGY | Facility: CLINIC | Age: 74
End: 2024-06-04

## 2024-06-06 ENCOUNTER — DOCTOR'S OFFICE (OUTPATIENT)
Dept: URBAN - NONMETROPOLITAN AREA CLINIC 1 | Facility: CLINIC | Age: 74
Setting detail: OPHTHALMOLOGY
End: 2024-06-06
Payer: MEDICARE

## 2024-06-06 DIAGNOSIS — H35.3211: ICD-10-CM

## 2024-06-06 DIAGNOSIS — H35.3124: ICD-10-CM

## 2024-06-06 DIAGNOSIS — H43.811: ICD-10-CM

## 2024-06-06 DIAGNOSIS — H35.051: ICD-10-CM

## 2024-06-06 PROCEDURE — 99214 OFFICE O/P EST MOD 30 MIN: CPT | Performed by: OPHTHALMOLOGY

## 2024-06-06 PROCEDURE — 92240 ICG ANGIOGRAPHY I&R UNI/BI: CPT | Performed by: OPHTHALMOLOGY

## 2024-06-06 ASSESSMENT — CONFRONTATIONAL VISUAL FIELD TEST (CVF)
OD_FINDINGS: FULL
OS_FINDINGS: FULL

## 2024-06-06 ASSESSMENT — LID POSITION - DERMATOCHALASIS
OS_DERMATOCHALASIS: LUL 1+
OD_DERMATOCHALASIS: RUL 1+

## 2024-06-20 ENCOUNTER — DOCTOR'S OFFICE (OUTPATIENT)
Dept: URBAN - NONMETROPOLITAN AREA CLINIC 1 | Facility: CLINIC | Age: 74
Setting detail: OPHTHALMOLOGY
End: 2024-06-20
Payer: MEDICARE

## 2024-06-20 DIAGNOSIS — H35.3211: ICD-10-CM

## 2024-06-20 PROCEDURE — 67028 INJECTION EYE DRUG: CPT | Mod: RT | Performed by: OPHTHALMOLOGY

## 2024-07-11 ENCOUNTER — DOCTOR'S OFFICE (OUTPATIENT)
Dept: URBAN - NONMETROPOLITAN AREA CLINIC 1 | Facility: CLINIC | Age: 74
Setting detail: OPHTHALMOLOGY
End: 2024-07-11
Payer: MEDICARE

## 2024-07-11 DIAGNOSIS — H35.3211: ICD-10-CM

## 2024-07-11 DIAGNOSIS — H35.051: ICD-10-CM

## 2024-07-11 DIAGNOSIS — H35.3124: ICD-10-CM

## 2024-07-11 DIAGNOSIS — H35.371: ICD-10-CM

## 2024-07-11 PROCEDURE — 99213 OFFICE O/P EST LOW 20 MIN: CPT | Performed by: OPHTHALMOLOGY

## 2024-07-11 PROCEDURE — 92134 CPTRZ OPH DX IMG PST SGM RTA: CPT | Performed by: OPHTHALMOLOGY

## 2024-07-11 ASSESSMENT — LID POSITION - DERMATOCHALASIS
OD_DERMATOCHALASIS: RUL 1+
OS_DERMATOCHALASIS: LUL 1+

## 2024-07-20 ENCOUNTER — DOCTOR'S OFFICE (OUTPATIENT)
Dept: URBAN - NONMETROPOLITAN AREA CLINIC 1 | Facility: CLINIC | Age: 74
Setting detail: OPHTHALMOLOGY
End: 2024-07-20
Payer: MEDICARE

## 2024-07-20 ENCOUNTER — RX ONLY (RX ONLY)
Age: 74
End: 2024-07-20

## 2024-07-20 DIAGNOSIS — H35.3211: ICD-10-CM

## 2024-07-20 PROCEDURE — 67028 INJECTION EYE DRUG: CPT | Mod: RT | Performed by: OPHTHALMOLOGY

## 2024-08-08 ENCOUNTER — DOCTOR'S OFFICE (OUTPATIENT)
Dept: URBAN - NONMETROPOLITAN AREA CLINIC 1 | Facility: CLINIC | Age: 74
Setting detail: OPHTHALMOLOGY
End: 2024-08-08
Payer: MEDICARE

## 2024-08-08 DIAGNOSIS — H35.371: ICD-10-CM

## 2024-08-08 DIAGNOSIS — H43.811: ICD-10-CM

## 2024-08-08 DIAGNOSIS — H35.3211: ICD-10-CM

## 2024-08-08 DIAGNOSIS — H35.3124: ICD-10-CM

## 2024-08-08 DIAGNOSIS — H35.051: ICD-10-CM

## 2024-08-08 PROCEDURE — 92235 FLUORESCEIN ANGRPH MLTIFRAME: CPT | Performed by: OPHTHALMOLOGY

## 2024-08-08 PROCEDURE — 92250 FUNDUS PHOTOGRAPHY W/I&R: CPT | Performed by: OPHTHALMOLOGY

## 2024-08-08 PROCEDURE — 99214 OFFICE O/P EST MOD 30 MIN: CPT | Performed by: OPHTHALMOLOGY

## 2024-08-08 ASSESSMENT — LID POSITION - DERMATOCHALASIS
OS_DERMATOCHALASIS: LUL 1+
OD_DERMATOCHALASIS: RUL 1+

## 2024-08-08 ASSESSMENT — CONFRONTATIONAL VISUAL FIELD TEST (CVF)
OD_FINDINGS: FULL
OS_FINDINGS: FULL

## 2024-08-17 ENCOUNTER — DOCTOR'S OFFICE (OUTPATIENT)
Dept: URBAN - NONMETROPOLITAN AREA CLINIC 1 | Facility: CLINIC | Age: 74
Setting detail: OPHTHALMOLOGY
End: 2024-08-17
Payer: MEDICARE

## 2024-08-17 DIAGNOSIS — H35.3211: ICD-10-CM

## 2024-08-17 PROCEDURE — 67028 INJECTION EYE DRUG: CPT | Mod: RT | Performed by: OPHTHALMOLOGY

## 2024-08-29 ENCOUNTER — DOCTOR'S OFFICE (OUTPATIENT)
Dept: URBAN - NONMETROPOLITAN AREA CLINIC 1 | Facility: CLINIC | Age: 74
Setting detail: OPHTHALMOLOGY
End: 2024-08-29
Payer: MEDICARE

## 2024-08-29 DIAGNOSIS — H35.3211: ICD-10-CM

## 2024-08-29 DIAGNOSIS — H35.3124: ICD-10-CM

## 2024-08-29 DIAGNOSIS — H35.051: ICD-10-CM

## 2024-08-29 PROCEDURE — 92134 CPTRZ OPH DX IMG PST SGM RTA: CPT | Performed by: OPHTHALMOLOGY

## 2024-08-29 PROCEDURE — 92012 INTRM OPH EXAM EST PATIENT: CPT | Performed by: OPHTHALMOLOGY

## 2024-08-29 ASSESSMENT — LID POSITION - DERMATOCHALASIS
OS_DERMATOCHALASIS: LUL 1+
OD_DERMATOCHALASIS: RUL 1+

## 2024-08-29 ASSESSMENT — CONFRONTATIONAL VISUAL FIELD TEST (CVF)
OD_FINDINGS: FULL
OS_FINDINGS: FULL

## 2024-09-14 ENCOUNTER — DOCTOR'S OFFICE (OUTPATIENT)
Dept: URBAN - NONMETROPOLITAN AREA CLINIC 1 | Facility: CLINIC | Age: 74
Setting detail: OPHTHALMOLOGY
End: 2024-09-14
Payer: MEDICARE

## 2024-09-14 DIAGNOSIS — H35.3211: ICD-10-CM

## 2024-09-14 PROCEDURE — 67028 INJECTION EYE DRUG: CPT | Mod: RT | Performed by: OPHTHALMOLOGY

## 2024-09-14 ASSESSMENT — REFRACTION_AUTOREFRACTION
OD_SPHERE: -0.50
OD_AXIS: 061
OD_CYLINDER: -0.75
OS_CYLINDER: SPH
OS_SPHERE: -1.00

## 2024-09-14 ASSESSMENT — CONFRONTATIONAL VISUAL FIELD TEST (CVF)
OS_FINDINGS: FULL
OD_FINDINGS: FULL

## 2024-09-14 ASSESSMENT — KERATOMETRY
OS_K2POWER_DIOPTERS: 43.00
OD_K1POWER_DIOPTERS: 41.50
OS_AXISANGLE_DEGREES: 006
OD_AXISANGLE_DEGREES: 154
OD_K2POWER_DIOPTERS: 41.75
OS_K1POWER_DIOPTERS: 42.25

## 2024-09-14 ASSESSMENT — VISUAL ACUITY
OS_BCVA: 20/40-1
OD_BCVA: 20/CF@FACE

## 2024-10-09 ENCOUNTER — DOCTOR'S OFFICE (OUTPATIENT)
Dept: URBAN - NONMETROPOLITAN AREA CLINIC 1 | Facility: CLINIC | Age: 74
Setting detail: OPHTHALMOLOGY
End: 2024-10-09
Payer: MEDICARE

## 2024-10-09 VITALS — HEIGHT: 60 IN

## 2024-10-09 DIAGNOSIS — H35.3211: ICD-10-CM

## 2024-10-09 DIAGNOSIS — H40.013: ICD-10-CM

## 2024-10-09 PROCEDURE — 99213 OFFICE O/P EST LOW 20 MIN: CPT | Performed by: OPHTHALMOLOGY

## 2024-10-09 PROCEDURE — 92134 CPTRZ OPH DX IMG PST SGM RTA: CPT | Performed by: OPHTHALMOLOGY

## 2024-10-09 PROCEDURE — 92083 EXTENDED VISUAL FIELD XM: CPT | Performed by: OPHTHALMOLOGY

## 2024-10-09 PROCEDURE — 76514 ECHO EXAM OF EYE THICKNESS: CPT | Performed by: OPHTHALMOLOGY

## 2024-10-09 ASSESSMENT — PACHYMETRY
OD_CT_CORRECTION: -1
OS_CT_UM: 568
OD_CT_UM: 558
OS_CT_CORRECTION: -1

## 2024-10-09 ASSESSMENT — TONOMETRY
OS_IOP_MMHG: 16
OD_IOP_MMHG: 16

## 2024-10-09 ASSESSMENT — KERATOMETRY
OS_K1POWER_DIOPTERS: 42.25
OS_AXISANGLE_DEGREES: 006
OD_K1POWER_DIOPTERS: 41.50
OD_AXISANGLE_DEGREES: 154
OD_K2POWER_DIOPTERS: 41.75
OS_K2POWER_DIOPTERS: 43.00

## 2024-10-09 ASSESSMENT — VISUAL ACUITY
OD_BCVA: CF 1FT
OS_BCVA: 20/30-1

## 2024-10-09 ASSESSMENT — REFRACTION_AUTOREFRACTION
OS_SPHERE: -1.00
OD_SPHERE: -0.50
OS_CYLINDER: SPH
OD_AXIS: 061
OD_CYLINDER: -0.75

## 2024-10-09 ASSESSMENT — LID POSITION - DERMATOCHALASIS
OS_DERMATOCHALASIS: LUL 1+
OD_DERMATOCHALASIS: RUL 1+

## 2024-10-09 ASSESSMENT — MACULA - RETINAL PIGMENT EPITHELIAL CHANGES: OD_RPE_CHANGES: 1+

## 2024-10-09 ASSESSMENT — CONFRONTATIONAL VISUAL FIELD TEST (CVF)
OD_FINDINGS: FULL
OS_FINDINGS: CONSTRICTION

## 2024-10-12 ENCOUNTER — DOCTOR'S OFFICE (OUTPATIENT)
Dept: URBAN - NONMETROPOLITAN AREA CLINIC 1 | Facility: CLINIC | Age: 74
Setting detail: OPHTHALMOLOGY
End: 2024-10-12
Payer: MEDICARE

## 2024-10-12 DIAGNOSIS — H43.811: ICD-10-CM

## 2024-10-12 DIAGNOSIS — H35.051: ICD-10-CM

## 2024-10-12 DIAGNOSIS — H35.371: ICD-10-CM

## 2024-10-12 DIAGNOSIS — H35.3124: ICD-10-CM

## 2024-10-12 DIAGNOSIS — H35.3211: ICD-10-CM

## 2024-10-12 PROCEDURE — 92134 CPTRZ OPH DX IMG PST SGM RTA: CPT | Performed by: OPHTHALMOLOGY

## 2024-10-12 PROCEDURE — 99214 OFFICE O/P EST MOD 30 MIN: CPT | Performed by: OPHTHALMOLOGY

## 2024-10-12 ASSESSMENT — KERATOMETRY
OS_AXISANGLE_DEGREES: 006
OD_AXISANGLE_DEGREES: 154
OD_K2POWER_DIOPTERS: 41.75
OS_K2POWER_DIOPTERS: 43.00
OD_K1POWER_DIOPTERS: 41.50
OS_K1POWER_DIOPTERS: 42.25

## 2024-10-12 ASSESSMENT — CONFRONTATIONAL VISUAL FIELD TEST (CVF)
OD_FINDINGS: FULL
OS_FINDINGS: CONSTRICTION

## 2024-10-12 ASSESSMENT — VISUAL ACUITY
OS_BCVA: 20/40
OD_BCVA: CF 1FT

## 2024-10-12 ASSESSMENT — REFRACTION_AUTOREFRACTION
OS_CYLINDER: SPH
OD_AXIS: 061
OD_SPHERE: -0.50
OD_CYLINDER: -0.75
OS_SPHERE: -1.00

## 2024-10-12 ASSESSMENT — LID POSITION - DERMATOCHALASIS
OD_DERMATOCHALASIS: RUL 1+
OS_DERMATOCHALASIS: LUL 1+

## 2024-10-19 ENCOUNTER — DOCTOR'S OFFICE (OUTPATIENT)
Dept: URBAN - NONMETROPOLITAN AREA CLINIC 1 | Facility: CLINIC | Age: 74
Setting detail: OPHTHALMOLOGY
End: 2024-10-19
Payer: MEDICARE

## 2024-10-19 DIAGNOSIS — H35.3211: ICD-10-CM

## 2024-10-19 PROBLEM — H35.40 PERIPAPILLARY ATROPHY ; BOTH EYES: Status: ACTIVE | Noted: 2024-10-09

## 2024-10-19 PROCEDURE — 67028 INJECTION EYE DRUG: CPT | Mod: RT | Performed by: OPHTHALMOLOGY

## 2024-10-19 ASSESSMENT — REFRACTION_AUTOREFRACTION
OS_SPHERE: -1.00
OS_CYLINDER: SPH
OD_SPHERE: -0.50
OD_AXIS: 061
OD_CYLINDER: -0.75

## 2024-10-19 ASSESSMENT — KERATOMETRY
OS_K2POWER_DIOPTERS: 43.00
OD_K2POWER_DIOPTERS: 41.75
OS_K1POWER_DIOPTERS: 42.25
OD_K1POWER_DIOPTERS: 41.50
OD_AXISANGLE_DEGREES: 154
OS_AXISANGLE_DEGREES: 006

## 2024-10-19 ASSESSMENT — VISUAL ACUITY
OD_BCVA: CF 1FT
OS_BCVA: 20/70+2

## 2024-11-09 ENCOUNTER — DOCTOR'S OFFICE (OUTPATIENT)
Dept: URBAN - NONMETROPOLITAN AREA CLINIC 1 | Facility: CLINIC | Age: 74
Setting detail: OPHTHALMOLOGY
End: 2024-11-09
Payer: MEDICARE

## 2024-11-09 DIAGNOSIS — H43.811: ICD-10-CM

## 2024-11-09 DIAGNOSIS — H35.051: ICD-10-CM

## 2024-11-09 DIAGNOSIS — H35.3124: ICD-10-CM

## 2024-11-09 DIAGNOSIS — H35.371: ICD-10-CM

## 2024-11-09 DIAGNOSIS — H35.3211: ICD-10-CM

## 2024-11-09 PROCEDURE — 92134 CPTRZ OPH DX IMG PST SGM RTA: CPT | Performed by: OPHTHALMOLOGY

## 2024-11-09 PROCEDURE — 99214 OFFICE O/P EST MOD 30 MIN: CPT | Performed by: OPHTHALMOLOGY

## 2024-11-09 ASSESSMENT — KERATOMETRY
OS_AXISANGLE_DEGREES: 006
OD_K2POWER_DIOPTERS: 41.75
OD_AXISANGLE_DEGREES: 154
OD_K1POWER_DIOPTERS: 41.50
OS_K2POWER_DIOPTERS: 43.00
OS_K1POWER_DIOPTERS: 42.25

## 2024-11-09 ASSESSMENT — REFRACTION_AUTOREFRACTION
OS_CYLINDER: SPH
OS_SPHERE: -1.00
OD_SPHERE: -0.50
OD_AXIS: 061
OD_CYLINDER: -0.75

## 2024-11-09 ASSESSMENT — LID POSITION - DERMATOCHALASIS
OS_DERMATOCHALASIS: LUL 1+
OD_DERMATOCHALASIS: RUL 1+

## 2024-11-09 ASSESSMENT — VISUAL ACUITY
OD_BCVA: CF 1FT
OS_BCVA: 20/70+2

## 2024-11-09 ASSESSMENT — CONFRONTATIONAL VISUAL FIELD TEST (CVF)
OS_FINDINGS: CONSTRICTION
OD_FINDINGS: FULL

## 2024-11-09 ASSESSMENT — MACULA - RETINAL PIGMENT EPITHELIAL CHANGES: OD_RPE_CHANGES: 1+

## 2024-12-02 ENCOUNTER — DOCTOR'S OFFICE (OUTPATIENT)
Dept: URBAN - NONMETROPOLITAN AREA CLINIC 1 | Facility: CLINIC | Age: 74
Setting detail: OPHTHALMOLOGY
End: 2024-12-02
Payer: MEDICARE

## 2024-12-02 DIAGNOSIS — H35.3211: ICD-10-CM

## 2024-12-02 DIAGNOSIS — H35.371: ICD-10-CM

## 2024-12-02 DIAGNOSIS — H35.3124: ICD-10-CM

## 2024-12-02 DIAGNOSIS — H35.051: ICD-10-CM

## 2024-12-02 PROCEDURE — 99214 OFFICE O/P EST MOD 30 MIN: CPT | Performed by: OPHTHALMOLOGY

## 2024-12-02 PROCEDURE — 92250 FUNDUS PHOTOGRAPHY W/I&R: CPT | Performed by: OPHTHALMOLOGY

## 2024-12-02 PROCEDURE — 92235 FLUORESCEIN ANGRPH MLTIFRAME: CPT | Performed by: OPHTHALMOLOGY

## 2024-12-02 ASSESSMENT — VISUAL ACUITY
OD_BCVA: CF 1FT
OS_BCVA: 20/30-2

## 2024-12-02 ASSESSMENT — REFRACTION_AUTOREFRACTION
OS_SPHERE: -1.00
OD_AXIS: 061
OD_SPHERE: -0.50
OS_CYLINDER: SPH
OD_CYLINDER: -0.75

## 2024-12-02 ASSESSMENT — LID POSITION - DERMATOCHALASIS
OS_DERMATOCHALASIS: LUL 1+
OD_DERMATOCHALASIS: RUL 1+

## 2024-12-02 ASSESSMENT — KERATOMETRY
OS_AXISANGLE_DEGREES: 006
OD_K1POWER_DIOPTERS: 41.50
OD_AXISANGLE_DEGREES: 154
OS_K1POWER_DIOPTERS: 42.25
OD_K2POWER_DIOPTERS: 41.75
OS_K2POWER_DIOPTERS: 43.00

## 2024-12-02 ASSESSMENT — CONFRONTATIONAL VISUAL FIELD TEST (CVF)
OD_FINDINGS: FULL
OS_FINDINGS: FULL

## 2024-12-02 ASSESSMENT — MACULA - RETINAL PIGMENT EPITHELIAL CHANGES: OD_RPE_CHANGES: 1+

## 2024-12-09 ENCOUNTER — DOCTOR'S OFFICE (OUTPATIENT)
Dept: URBAN - NONMETROPOLITAN AREA CLINIC 1 | Facility: CLINIC | Age: 74
Setting detail: OPHTHALMOLOGY
End: 2024-12-09
Payer: MEDICARE

## 2024-12-09 DIAGNOSIS — H35.3211: ICD-10-CM

## 2024-12-09 PROCEDURE — 67028 INJECTION EYE DRUG: CPT | Mod: RT | Performed by: OPHTHALMOLOGY

## 2024-12-09 ASSESSMENT — REFRACTION_AUTOREFRACTION
OD_CYLINDER: -0.75
OS_SPHERE: -1.00
OD_SPHERE: -0.50
OD_AXIS: 061
OS_CYLINDER: SPH

## 2024-12-09 ASSESSMENT — KERATOMETRY
OD_AXISANGLE_DEGREES: 154
OD_K2POWER_DIOPTERS: 41.75
OS_K2POWER_DIOPTERS: 43.00
OS_AXISANGLE_DEGREES: 006
OS_K1POWER_DIOPTERS: 42.25
OD_K1POWER_DIOPTERS: 41.50

## 2024-12-09 ASSESSMENT — VISUAL ACUITY
OS_BCVA: 20/50+2
OD_BCVA: CF 1FT

## 2025-01-06 ENCOUNTER — DOCTOR'S OFFICE (OUTPATIENT)
Dept: URBAN - NONMETROPOLITAN AREA CLINIC 1 | Facility: CLINIC | Age: 75
Setting detail: OPHTHALMOLOGY
End: 2025-01-06
Payer: MEDICARE

## 2025-01-06 DIAGNOSIS — H35.3211: ICD-10-CM

## 2025-01-06 DIAGNOSIS — H35.053: ICD-10-CM

## 2025-01-06 DIAGNOSIS — H35.371: ICD-10-CM

## 2025-01-06 DIAGNOSIS — H35.3124: ICD-10-CM

## 2025-01-06 DIAGNOSIS — H35.372: ICD-10-CM

## 2025-01-06 PROCEDURE — 92202 OPSCPY EXTND ON/MAC DRAW: CPT | Performed by: OPHTHALMOLOGY

## 2025-01-06 PROCEDURE — 99214 OFFICE O/P EST MOD 30 MIN: CPT | Performed by: OPHTHALMOLOGY

## 2025-01-06 PROCEDURE — 92134 CPTRZ OPH DX IMG PST SGM RTA: CPT | Performed by: OPHTHALMOLOGY

## 2025-01-06 ASSESSMENT — KERATOMETRY
OS_K1POWER_DIOPTERS: 42.25
OS_K2POWER_DIOPTERS: 43.00
OD_K1POWER_DIOPTERS: 41.50
OD_K2POWER_DIOPTERS: 41.75
OD_AXISANGLE_DEGREES: 154
OS_AXISANGLE_DEGREES: 006

## 2025-01-06 ASSESSMENT — CONFRONTATIONAL VISUAL FIELD TEST (CVF)
OD_FINDINGS: FULL
OS_FINDINGS: FULL

## 2025-01-06 ASSESSMENT — MACULA - RETINAL PIGMENT EPITHELIAL CHANGES: OD_RPE_CHANGES: 1+

## 2025-01-06 ASSESSMENT — LID POSITION - DERMATOCHALASIS
OD_DERMATOCHALASIS: RUL 1+
OS_DERMATOCHALASIS: LUL 1+

## 2025-01-06 ASSESSMENT — REFRACTION_AUTOREFRACTION
OD_AXIS: 061
OD_SPHERE: -0.50
OD_CYLINDER: -0.75
OS_SPHERE: -1.00
OS_CYLINDER: SPH

## 2025-01-06 ASSESSMENT — VISUAL ACUITY: OS_BCVA: 20/20-1

## 2025-01-13 ENCOUNTER — DOCTOR'S OFFICE (OUTPATIENT)
Dept: URBAN - NONMETROPOLITAN AREA CLINIC 1 | Facility: CLINIC | Age: 75
Setting detail: OPHTHALMOLOGY
End: 2025-01-13
Payer: MEDICARE

## 2025-01-13 DIAGNOSIS — H35.371: ICD-10-CM

## 2025-01-13 DIAGNOSIS — H35.053: ICD-10-CM

## 2025-01-13 DIAGNOSIS — H35.3211: ICD-10-CM

## 2025-01-13 DIAGNOSIS — H35.3124: ICD-10-CM

## 2025-01-13 PROCEDURE — 99214 OFFICE O/P EST MOD 30 MIN: CPT | Performed by: OPHTHALMOLOGY

## 2025-01-13 PROCEDURE — 92240 ICG ANGIOGRAPHY I&R UNI/BI: CPT | Performed by: OPHTHALMOLOGY

## 2025-01-13 ASSESSMENT — KERATOMETRY
OD_AXISANGLE_DEGREES: 154
OS_K1POWER_DIOPTERS: 42.25
OD_K2POWER_DIOPTERS: 41.75
OS_K2POWER_DIOPTERS: 43.00
OS_AXISANGLE_DEGREES: 006
OD_K1POWER_DIOPTERS: 41.50

## 2025-01-13 ASSESSMENT — REFRACTION_AUTOREFRACTION
OS_CYLINDER: SPH
OS_SPHERE: -1.00
OD_CYLINDER: -0.75
OD_AXIS: 061
OD_SPHERE: -0.50

## 2025-01-13 ASSESSMENT — LID POSITION - DERMATOCHALASIS
OD_DERMATOCHALASIS: RUL 1+
OS_DERMATOCHALASIS: LUL 1+

## 2025-01-13 ASSESSMENT — VISUAL ACUITY: OS_BCVA: 20/25-1

## 2025-01-13 ASSESSMENT — CONFRONTATIONAL VISUAL FIELD TEST (CVF)
OS_FINDINGS: FULL
OD_FINDINGS: FULL

## 2025-01-17 DIAGNOSIS — I77.810 THORACIC AORTIC ECTASIA (HCC): ICD-10-CM

## 2025-01-23 ENCOUNTER — DOCTOR'S OFFICE (OUTPATIENT)
Dept: URBAN - NONMETROPOLITAN AREA CLINIC 1 | Facility: CLINIC | Age: 75
Setting detail: OPHTHALMOLOGY
End: 2025-01-23
Payer: MEDICARE

## 2025-01-23 DIAGNOSIS — H35.3211: ICD-10-CM

## 2025-01-23 PROBLEM — H35.371 EPIRETINAL MEMBRANE; RIGHT EYE, LEFT EYE: Status: ACTIVE | Noted: 2025-01-06

## 2025-01-23 PROBLEM — H35.053 CHOROIDAL NEOVASCULARIZATION; BOTH EYES: Status: ACTIVE | Noted: 2025-01-06

## 2025-01-23 PROBLEM — H35.372 EPIRETINAL MEMBRANE; RIGHT EYE, LEFT EYE: Status: ACTIVE | Noted: 2025-01-06

## 2025-01-23 PROCEDURE — 67028 INJECTION EYE DRUG: CPT | Mod: RT | Performed by: OPHTHALMOLOGY

## 2025-01-23 ASSESSMENT — VISUAL ACUITY: OS_BCVA: 20/50+1

## 2025-01-23 ASSESSMENT — REFRACTION_AUTOREFRACTION
OD_AXIS: 061
OD_SPHERE: -0.50
OS_CYLINDER: SPH
OS_SPHERE: -1.00
OD_CYLINDER: -0.75

## 2025-01-23 ASSESSMENT — KERATOMETRY
OD_AXISANGLE_DEGREES: 154
OS_AXISANGLE_DEGREES: 006
OS_K2POWER_DIOPTERS: 43.00
OS_K1POWER_DIOPTERS: 42.25
OD_K1POWER_DIOPTERS: 41.50
OD_K2POWER_DIOPTERS: 41.75

## 2025-02-10 ENCOUNTER — DOCTOR'S OFFICE (OUTPATIENT)
Dept: URBAN - NONMETROPOLITAN AREA CLINIC 1 | Facility: CLINIC | Age: 75
Setting detail: OPHTHALMOLOGY
End: 2025-02-10
Payer: MEDICARE

## 2025-02-10 DIAGNOSIS — H35.371: ICD-10-CM

## 2025-02-10 DIAGNOSIS — H35.372: ICD-10-CM

## 2025-02-10 DIAGNOSIS — H35.3211: ICD-10-CM

## 2025-02-10 DIAGNOSIS — H35.053: ICD-10-CM

## 2025-02-10 DIAGNOSIS — H35.3124: ICD-10-CM

## 2025-02-10 PROCEDURE — 99213 OFFICE O/P EST LOW 20 MIN: CPT | Performed by: OPHTHALMOLOGY

## 2025-02-10 PROCEDURE — 92134 CPTRZ OPH DX IMG PST SGM RTA: CPT | Performed by: OPHTHALMOLOGY

## 2025-02-10 ASSESSMENT — KERATOMETRY
OD_K2POWER_DIOPTERS: 41.75
OD_AXISANGLE_DEGREES: 154
OS_AXISANGLE_DEGREES: 006
OD_K1POWER_DIOPTERS: 41.50
OS_K2POWER_DIOPTERS: 43.00
OS_K1POWER_DIOPTERS: 42.25

## 2025-02-10 ASSESSMENT — LID POSITION - DERMATOCHALASIS
OS_DERMATOCHALASIS: LUL 1+
OD_DERMATOCHALASIS: RUL 1+

## 2025-02-10 ASSESSMENT — VISUAL ACUITY
OD_BCVA: CF 2FT
OS_BCVA: 20/30

## 2025-02-10 ASSESSMENT — MACULA - RETINAL PIGMENT EPITHELIAL CHANGES: OD_RPE_CHANGES: 1+

## 2025-02-10 ASSESSMENT — REFRACTION_AUTOREFRACTION
OS_SPHERE: -1.00
OD_SPHERE: -0.50
OD_AXIS: 061
OS_CYLINDER: SPH
OD_CYLINDER: -0.75

## 2025-02-10 ASSESSMENT — CONFRONTATIONAL VISUAL FIELD TEST (CVF)
OD_FINDINGS: FULL
OS_FINDINGS: FULL

## 2025-02-20 ENCOUNTER — DOCTOR'S OFFICE (OUTPATIENT)
Dept: URBAN - NONMETROPOLITAN AREA CLINIC 1 | Facility: CLINIC | Age: 75
Setting detail: OPHTHALMOLOGY
End: 2025-02-20
Payer: MEDICARE

## 2025-02-20 DIAGNOSIS — H35.3211: ICD-10-CM

## 2025-02-20 PROCEDURE — 67028 INJECTION EYE DRUG: CPT | Mod: RT | Performed by: OPHTHALMOLOGY

## 2025-02-20 ASSESSMENT — VISUAL ACUITY
OS_BCVA: 20/20
OD_BCVA: CF 2FT

## 2025-02-20 ASSESSMENT — REFRACTION_AUTOREFRACTION
OD_CYLINDER: -0.75
OD_AXIS: 061
OS_SPHERE: -1.00
OD_SPHERE: -0.50
OS_CYLINDER: SPH

## 2025-02-20 ASSESSMENT — KERATOMETRY
OD_K2POWER_DIOPTERS: 41.75
OS_K1POWER_DIOPTERS: 42.25
OS_AXISANGLE_DEGREES: 006
OD_K1POWER_DIOPTERS: 41.50
OS_K2POWER_DIOPTERS: 43.00
OD_AXISANGLE_DEGREES: 154

## 2025-03-10 ENCOUNTER — DOCTOR'S OFFICE (OUTPATIENT)
Dept: URBAN - NONMETROPOLITAN AREA CLINIC 1 | Facility: CLINIC | Age: 75
Setting detail: OPHTHALMOLOGY
End: 2025-03-10
Payer: MEDICARE

## 2025-03-10 DIAGNOSIS — H35.053: ICD-10-CM

## 2025-03-10 DIAGNOSIS — H35.3211: ICD-10-CM

## 2025-03-10 DIAGNOSIS — H35.371: ICD-10-CM

## 2025-03-10 DIAGNOSIS — H35.3124: ICD-10-CM

## 2025-03-10 PROCEDURE — 92134 CPTRZ OPH DX IMG PST SGM RTA: CPT | Performed by: OPHTHALMOLOGY

## 2025-03-10 PROCEDURE — 99213 OFFICE O/P EST LOW 20 MIN: CPT | Performed by: OPHTHALMOLOGY

## 2025-03-10 ASSESSMENT — REFRACTION_AUTOREFRACTION
OD_AXIS: 061
OS_CYLINDER: SPH
OD_CYLINDER: -0.75
OD_SPHERE: -0.50
OS_SPHERE: -1.00

## 2025-03-10 ASSESSMENT — VISUAL ACUITY
OS_BCVA: 20/40
OD_BCVA: CF 2FT

## 2025-03-10 ASSESSMENT — KERATOMETRY
OS_K2POWER_DIOPTERS: 43.00
OS_AXISANGLE_DEGREES: 006
OD_AXISANGLE_DEGREES: 154
OS_K1POWER_DIOPTERS: 42.25
OD_K2POWER_DIOPTERS: 41.75
OD_K1POWER_DIOPTERS: 41.50

## 2025-03-10 ASSESSMENT — MACULA - RETINAL PIGMENT EPITHELIAL CHANGES: OD_RPE_CHANGES: 1+

## 2025-03-10 ASSESSMENT — LID POSITION - DERMATOCHALASIS
OD_DERMATOCHALASIS: RUL 1+
OS_DERMATOCHALASIS: LUL 1+

## 2025-03-10 ASSESSMENT — CONFRONTATIONAL VISUAL FIELD TEST (CVF)
OD_FINDINGS: FULL
OS_FINDINGS: FULL

## 2025-03-20 ENCOUNTER — DOCTOR'S OFFICE (OUTPATIENT)
Dept: URBAN - NONMETROPOLITAN AREA CLINIC 1 | Facility: CLINIC | Age: 75
Setting detail: OPHTHALMOLOGY
End: 2025-03-20
Payer: MEDICARE

## 2025-03-20 DIAGNOSIS — H35.3211: ICD-10-CM

## 2025-03-20 PROCEDURE — 67028 INJECTION EYE DRUG: CPT | Mod: RT | Performed by: OPHTHALMOLOGY

## 2025-03-20 ASSESSMENT — VISUAL ACUITY
OS_BCVA: 20/25+2
OD_BCVA: CF 2FT

## 2025-03-20 ASSESSMENT — KERATOMETRY
OS_K1POWER_DIOPTERS: 42.25
OS_AXISANGLE_DEGREES: 006
OD_AXISANGLE_DEGREES: 154
OD_K1POWER_DIOPTERS: 41.50
OS_K2POWER_DIOPTERS: 43.00
OD_K2POWER_DIOPTERS: 41.75

## 2025-03-20 ASSESSMENT — REFRACTION_AUTOREFRACTION
OD_SPHERE: -0.50
OD_AXIS: 061
OS_SPHERE: -1.00
OD_CYLINDER: -0.75
OS_CYLINDER: SPH

## 2025-04-07 ENCOUNTER — DOCTOR'S OFFICE (OUTPATIENT)
Dept: URBAN - NONMETROPOLITAN AREA CLINIC 1 | Facility: CLINIC | Age: 75
Setting detail: OPHTHALMOLOGY
End: 2025-04-07
Payer: MEDICARE

## 2025-04-07 DIAGNOSIS — H35.3211: ICD-10-CM

## 2025-04-07 DIAGNOSIS — H35.053: ICD-10-CM

## 2025-04-07 DIAGNOSIS — H35.3124: ICD-10-CM

## 2025-04-07 DIAGNOSIS — H35.372: ICD-10-CM

## 2025-04-07 PROCEDURE — 99213 OFFICE O/P EST LOW 20 MIN: CPT | Performed by: OPHTHALMOLOGY

## 2025-04-07 PROCEDURE — 92134 CPTRZ OPH DX IMG PST SGM RTA: CPT | Performed by: OPHTHALMOLOGY

## 2025-04-07 ASSESSMENT — REFRACTION_AUTOREFRACTION
OS_SPHERE: -1.00
OD_AXIS: 061
OD_CYLINDER: -0.75
OS_CYLINDER: SPH
OD_SPHERE: -0.50

## 2025-04-07 ASSESSMENT — KERATOMETRY
OS_AXISANGLE_DEGREES: 006
OS_K2POWER_DIOPTERS: 43.00
OD_K1POWER_DIOPTERS: 41.50
OS_K1POWER_DIOPTERS: 42.25
OD_AXISANGLE_DEGREES: 154
OD_K2POWER_DIOPTERS: 41.75

## 2025-04-07 ASSESSMENT — CONFRONTATIONAL VISUAL FIELD TEST (CVF)
OD_FINDINGS: FULL
OS_FINDINGS: FULL

## 2025-04-07 ASSESSMENT — LID POSITION - DERMATOCHALASIS
OD_DERMATOCHALASIS: RUL 1+
OS_DERMATOCHALASIS: LUL 1+

## 2025-04-07 ASSESSMENT — VISUAL ACUITY
OS_BCVA: 20/60-2
OD_BCVA: CF@1FT

## 2025-04-14 ENCOUNTER — DOCTOR'S OFFICE (OUTPATIENT)
Dept: URBAN - NONMETROPOLITAN AREA CLINIC 1 | Facility: CLINIC | Age: 75
Setting detail: OPHTHALMOLOGY
End: 2025-04-14
Payer: MEDICARE

## 2025-04-14 DIAGNOSIS — H35.3124: ICD-10-CM

## 2025-04-14 DIAGNOSIS — H35.3211: ICD-10-CM

## 2025-04-14 DIAGNOSIS — H35.053: ICD-10-CM

## 2025-04-14 DIAGNOSIS — H35.373: ICD-10-CM

## 2025-04-14 PROCEDURE — 92235 FLUORESCEIN ANGRPH MLTIFRAME: CPT | Performed by: OPHTHALMOLOGY

## 2025-04-14 PROCEDURE — 92250 FUNDUS PHOTOGRAPHY W/I&R: CPT | Performed by: OPHTHALMOLOGY

## 2025-04-14 PROCEDURE — 99213 OFFICE O/P EST LOW 20 MIN: CPT | Performed by: OPHTHALMOLOGY

## 2025-04-14 ASSESSMENT — VISUAL ACUITY
OS_BCVA: 20/25+1
OD_BCVA: CF@1FT

## 2025-04-14 ASSESSMENT — REFRACTION_AUTOREFRACTION
OD_SPHERE: -0.50
OD_CYLINDER: -0.75
OS_SPHERE: -1.00
OD_AXIS: 061
OS_CYLINDER: SPH

## 2025-04-14 ASSESSMENT — CONFRONTATIONAL VISUAL FIELD TEST (CVF)
OD_FINDINGS: FULL
OS_FINDINGS: FULL

## 2025-04-14 ASSESSMENT — KERATOMETRY
OS_K1POWER_DIOPTERS: 42.25
OS_AXISANGLE_DEGREES: 006
OS_K2POWER_DIOPTERS: 43.00
OD_K1POWER_DIOPTERS: 41.50
OD_AXISANGLE_DEGREES: 154
OD_K2POWER_DIOPTERS: 41.75

## 2025-04-14 ASSESSMENT — LID POSITION - DERMATOCHALASIS
OD_DERMATOCHALASIS: RUL 1+
OS_DERMATOCHALASIS: LUL 1+

## 2025-04-21 ENCOUNTER — DOCTOR'S OFFICE (OUTPATIENT)
Dept: URBAN - NONMETROPOLITAN AREA CLINIC 1 | Facility: CLINIC | Age: 75
Setting detail: OPHTHALMOLOGY
End: 2025-04-21
Payer: MEDICARE

## 2025-04-21 DIAGNOSIS — H35.3211: ICD-10-CM

## 2025-04-21 PROCEDURE — 67028 INJECTION EYE DRUG: CPT | Mod: RT | Performed by: OPHTHALMOLOGY

## 2025-04-21 ASSESSMENT — KERATOMETRY
OD_AXISANGLE_DEGREES: 154
OD_K2POWER_DIOPTERS: 41.75
OS_K2POWER_DIOPTERS: 43.00
OD_K1POWER_DIOPTERS: 41.50
OS_K1POWER_DIOPTERS: 42.25
OS_AXISANGLE_DEGREES: 006

## 2025-04-21 ASSESSMENT — VISUAL ACUITY
OD_BCVA: CF@1FT
OS_BCVA: 20/40-2

## 2025-04-21 ASSESSMENT — REFRACTION_AUTOREFRACTION
OD_AXIS: 061
OD_SPHERE: -0.50
OS_SPHERE: -1.00
OS_CYLINDER: SPH
OD_CYLINDER: -0.75

## 2025-04-23 ENCOUNTER — DOCTOR'S OFFICE (OUTPATIENT)
Dept: URBAN - NONMETROPOLITAN AREA CLINIC 1 | Facility: CLINIC | Age: 75
Setting detail: OPHTHALMOLOGY
End: 2025-04-23
Payer: MEDICARE

## 2025-04-23 DIAGNOSIS — H43.811: ICD-10-CM

## 2025-04-23 DIAGNOSIS — H40.1131: ICD-10-CM

## 2025-04-23 PROBLEM — H35.373 ERM; BOTH EYES: Status: ACTIVE | Noted: 2025-04-23

## 2025-04-23 PROBLEM — H26.493 POSTERIOR CAPSULAR OPACIFICATION; BOTH EYES: Status: ACTIVE | Noted: 2025-04-23

## 2025-04-23 PROCEDURE — 92014 COMPRE OPH EXAM EST PT 1/>: CPT | Performed by: OPHTHALMOLOGY

## 2025-04-23 PROCEDURE — 92133 CPTRZD OPH DX IMG PST SGM ON: CPT | Performed by: OPHTHALMOLOGY

## 2025-04-23 ASSESSMENT — REFRACTION_AUTOREFRACTION
OS_SPHERE: -1.00
OS_CYLINDER: -0.75
OD_CYLINDER: 0.00
OD_SPHERE: -0.75
OS_AXIS: 114

## 2025-04-23 ASSESSMENT — PACHYMETRY
OS_CT_UM: 568
OD_CT_UM: 558
OD_CT_CORRECTION: -1
OS_CT_CORRECTION: -1

## 2025-04-23 ASSESSMENT — CONFRONTATIONAL VISUAL FIELD TEST (CVF)
OS_FINDINGS: FULL
OD_FINDINGS: FULL

## 2025-04-23 ASSESSMENT — TONOMETRY
OD_IOP_MMHG: 16
OS_IOP_MMHG: 16

## 2025-04-23 ASSESSMENT — LID POSITION - DERMATOCHALASIS
OS_DERMATOCHALASIS: LUL 1+
OD_DERMATOCHALASIS: RUL 1+

## 2025-04-23 ASSESSMENT — VISUAL ACUITY
OD_BCVA: CF@1FT
OS_BCVA: 20/40-2

## 2025-04-23 ASSESSMENT — KERATOMETRY
OS_K2POWER_DIOPTERS: 42.00
OD_AXISANGLE_DEGREES: 170
OD_K1POWER_DIOPTERS: 40.75
OS_K1POWER_DIOPTERS: 41.50
OS_AXISANGLE_DEGREES: 002
OD_K2POWER_DIOPTERS: 42.25

## 2025-04-23 ASSESSMENT — MACULA - RETINAL PIGMENT EPITHELIAL CHANGES: OD_RPE_CHANGES: 1+

## 2025-05-12 ENCOUNTER — DOCTOR'S OFFICE (OUTPATIENT)
Dept: URBAN - NONMETROPOLITAN AREA CLINIC 1 | Facility: CLINIC | Age: 75
Setting detail: OPHTHALMOLOGY
End: 2025-05-12
Payer: MEDICARE

## 2025-05-12 DIAGNOSIS — H35.053: ICD-10-CM

## 2025-05-12 DIAGNOSIS — H35.3124: ICD-10-CM

## 2025-05-12 DIAGNOSIS — H43.811: ICD-10-CM

## 2025-05-12 DIAGNOSIS — H35.3211: ICD-10-CM

## 2025-05-12 PROCEDURE — 99214 OFFICE O/P EST MOD 30 MIN: CPT | Performed by: OPHTHALMOLOGY

## 2025-05-12 PROCEDURE — 92134 CPTRZ OPH DX IMG PST SGM RTA: CPT | Performed by: OPHTHALMOLOGY

## 2025-05-12 ASSESSMENT — KERATOMETRY
OD_K2POWER_DIOPTERS: 42.25
OD_AXISANGLE_DEGREES: 170
OS_AXISANGLE_DEGREES: 002
OS_K1POWER_DIOPTERS: 41.50
OS_K2POWER_DIOPTERS: 42.00
OD_K1POWER_DIOPTERS: 40.75

## 2025-05-12 ASSESSMENT — LID POSITION - DERMATOCHALASIS
OD_DERMATOCHALASIS: RUL 1+
OS_DERMATOCHALASIS: LUL 1+

## 2025-05-12 ASSESSMENT — VISUAL ACUITY
OS_BCVA: 20/30-2
OD_BCVA: CF@1FT

## 2025-05-12 ASSESSMENT — REFRACTION_AUTOREFRACTION
OD_CYLINDER: 0.00
OS_AXIS: 114
OS_CYLINDER: -0.75
OS_SPHERE: -1.00
OD_SPHERE: -0.75

## 2025-05-12 ASSESSMENT — CONFRONTATIONAL VISUAL FIELD TEST (CVF)
OS_FINDINGS: FULL
OD_FINDINGS: FULL

## 2025-05-19 ENCOUNTER — DOCTOR'S OFFICE (OUTPATIENT)
Dept: URBAN - NONMETROPOLITAN AREA CLINIC 1 | Facility: CLINIC | Age: 75
Setting detail: OPHTHALMOLOGY
End: 2025-05-19
Payer: MEDICARE

## 2025-05-19 DIAGNOSIS — H35.3211: ICD-10-CM

## 2025-05-19 PROCEDURE — 67028 INJECTION EYE DRUG: CPT | Mod: RT | Performed by: OPHTHALMOLOGY

## 2025-05-19 ASSESSMENT — REFRACTION_AUTOREFRACTION
OS_AXIS: 114
OS_CYLINDER: -0.75
OS_SPHERE: -1.00
OD_SPHERE: -0.75
OD_CYLINDER: 0.00

## 2025-05-19 ASSESSMENT — KERATOMETRY
OD_K2POWER_DIOPTERS: 42.25
OD_K1POWER_DIOPTERS: 40.75
OS_K1POWER_DIOPTERS: 41.50
OS_K2POWER_DIOPTERS: 42.00
OD_AXISANGLE_DEGREES: 170
OS_AXISANGLE_DEGREES: 002

## 2025-05-19 ASSESSMENT — VISUAL ACUITY
OD_BCVA: CF@1FT
OS_BCVA: 20/60+1

## 2025-06-09 ENCOUNTER — DOCTOR'S OFFICE (OUTPATIENT)
Dept: URBAN - NONMETROPOLITAN AREA CLINIC 1 | Facility: CLINIC | Age: 75
Setting detail: OPHTHALMOLOGY
End: 2025-06-09
Payer: MEDICARE

## 2025-06-09 DIAGNOSIS — H40.1131: ICD-10-CM

## 2025-06-09 DIAGNOSIS — H35.372: ICD-10-CM

## 2025-06-09 DIAGNOSIS — H43.811: ICD-10-CM

## 2025-06-09 DIAGNOSIS — H35.3211: ICD-10-CM

## 2025-06-09 DIAGNOSIS — H35.371: ICD-10-CM

## 2025-06-09 DIAGNOSIS — H35.053: ICD-10-CM

## 2025-06-09 PROCEDURE — 99213 OFFICE O/P EST LOW 20 MIN: CPT | Performed by: OPHTHALMOLOGY

## 2025-06-09 PROCEDURE — 92134 CPTRZ OPH DX IMG PST SGM RTA: CPT | Performed by: OPHTHALMOLOGY

## 2025-06-09 ASSESSMENT — KERATOMETRY
OD_AXISANGLE_DEGREES: 170
OD_K2POWER_DIOPTERS: 42.25
OS_K1POWER_DIOPTERS: 41.50
OD_K1POWER_DIOPTERS: 40.75
OS_K2POWER_DIOPTERS: 42.00
OS_AXISANGLE_DEGREES: 002

## 2025-06-09 ASSESSMENT — REFRACTION_AUTOREFRACTION
OS_CYLINDER: -0.75
OD_CYLINDER: 0.00
OS_SPHERE: -1.00
OS_AXIS: 114
OD_SPHERE: -0.75

## 2025-06-09 ASSESSMENT — VISUAL ACUITY
OD_BCVA: CF@1FT
OS_BCVA: 20/40-1

## 2025-06-09 ASSESSMENT — LID POSITION - DERMATOCHALASIS
OS_DERMATOCHALASIS: LUL 1+
OD_DERMATOCHALASIS: RUL 1+

## 2025-06-12 ENCOUNTER — DOCTOR'S OFFICE (OUTPATIENT)
Dept: URBAN - NONMETROPOLITAN AREA CLINIC 1 | Facility: CLINIC | Age: 75
Setting detail: OPHTHALMOLOGY
End: 2025-06-12
Payer: MEDICARE

## 2025-06-12 DIAGNOSIS — H35.3211: ICD-10-CM

## 2025-06-12 DIAGNOSIS — H35.3124: ICD-10-CM

## 2025-06-12 DIAGNOSIS — H43.811: ICD-10-CM

## 2025-06-12 DIAGNOSIS — H35.053: ICD-10-CM

## 2025-06-12 PROCEDURE — 99214 OFFICE O/P EST MOD 30 MIN: CPT | Performed by: OPHTHALMOLOGY

## 2025-06-12 PROCEDURE — 92240 ICG ANGIOGRAPHY I&R UNI/BI: CPT | Performed by: OPHTHALMOLOGY

## 2025-06-12 ASSESSMENT — KERATOMETRY
OD_AXISANGLE_DEGREES: 170
OD_K2POWER_DIOPTERS: 42.25
OS_AXISANGLE_DEGREES: 002
OS_K1POWER_DIOPTERS: 41.50
OS_K2POWER_DIOPTERS: 42.00
OD_K1POWER_DIOPTERS: 40.75

## 2025-06-12 ASSESSMENT — VISUAL ACUITY
OS_BCVA: 20/40
OD_BCVA: CF@1FT

## 2025-06-12 ASSESSMENT — REFRACTION_AUTOREFRACTION
OD_SPHERE: -0.75
OS_CYLINDER: -0.75
OD_CYLINDER: 0.00
OS_AXIS: 114
OS_SPHERE: -1.00

## 2025-06-12 ASSESSMENT — CONFRONTATIONAL VISUAL FIELD TEST (CVF)
OD_FINDINGS: FULL
OS_FINDINGS: FULL

## 2025-06-23 ENCOUNTER — DOCTOR'S OFFICE (OUTPATIENT)
Dept: URBAN - NONMETROPOLITAN AREA CLINIC 1 | Facility: CLINIC | Age: 75
Setting detail: OPHTHALMOLOGY
End: 2025-06-23
Payer: MEDICARE

## 2025-06-23 ENCOUNTER — RX ONLY (RX ONLY)
Age: 75
End: 2025-06-23

## 2025-06-23 DIAGNOSIS — H35.3211: ICD-10-CM

## 2025-06-23 PROCEDURE — 67028 INJECTION EYE DRUG: CPT | Mod: RT | Performed by: OPHTHALMOLOGY

## 2025-06-23 ASSESSMENT — KERATOMETRY
OS_K1POWER_DIOPTERS: 41.50
OD_K2POWER_DIOPTERS: 42.25
OD_K1POWER_DIOPTERS: 40.75
OS_K2POWER_DIOPTERS: 42.00
OD_AXISANGLE_DEGREES: 170
OS_AXISANGLE_DEGREES: 002

## 2025-06-23 ASSESSMENT — REFRACTION_AUTOREFRACTION
OD_CYLINDER: 0.00
OD_SPHERE: -0.75
OS_CYLINDER: -0.75
OS_SPHERE: -1.00
OS_AXIS: 114

## 2025-06-23 ASSESSMENT — VISUAL ACUITY
OD_BCVA: CF@1FT
OS_BCVA: 20/40-1

## 2025-07-17 ENCOUNTER — DOCTOR'S OFFICE (OUTPATIENT)
Dept: URBAN - NONMETROPOLITAN AREA CLINIC 1 | Facility: CLINIC | Age: 75
Setting detail: OPHTHALMOLOGY
End: 2025-07-17
Payer: MEDICARE

## 2025-07-17 DIAGNOSIS — H35.053: ICD-10-CM

## 2025-07-17 DIAGNOSIS — H35.3211: ICD-10-CM

## 2025-07-17 DIAGNOSIS — H35.3124: ICD-10-CM

## 2025-07-17 DIAGNOSIS — H43.811: ICD-10-CM

## 2025-07-17 PROCEDURE — 92134 CPTRZ OPH DX IMG PST SGM RTA: CPT | Performed by: OPHTHALMOLOGY

## 2025-07-17 PROCEDURE — 99213 OFFICE O/P EST LOW 20 MIN: CPT | Performed by: OPHTHALMOLOGY

## 2025-07-17 ASSESSMENT — LID POSITION - DERMATOCHALASIS
OS_DERMATOCHALASIS: LUL 1+
OD_DERMATOCHALASIS: RUL 1+

## 2025-07-17 ASSESSMENT — REFRACTION_AUTOREFRACTION
OD_CYLINDER: 0.00
OS_AXIS: 114
OD_SPHERE: -0.75
OS_CYLINDER: -0.75
OS_SPHERE: -1.00

## 2025-07-17 ASSESSMENT — KERATOMETRY
OD_K2POWER_DIOPTERS: 42.25
OS_K1POWER_DIOPTERS: 41.50
OS_K2POWER_DIOPTERS: 42.00
OD_AXISANGLE_DEGREES: 170
OS_AXISANGLE_DEGREES: 002
OD_K1POWER_DIOPTERS: 40.75

## 2025-07-17 ASSESSMENT — VISUAL ACUITY
OD_BCVA: CF@1FT
OS_BCVA: 20/40-1

## 2025-07-17 ASSESSMENT — CONFRONTATIONAL VISUAL FIELD TEST (CVF)
OS_FINDINGS: FULL
OD_FINDINGS: FULL

## 2025-07-21 DIAGNOSIS — I77.810 THORACIC AORTIC ECTASIA (HCC): ICD-10-CM

## 2025-07-26 ENCOUNTER — DOCTOR'S OFFICE (OUTPATIENT)
Dept: URBAN - NONMETROPOLITAN AREA CLINIC 1 | Facility: CLINIC | Age: 75
Setting detail: OPHTHALMOLOGY
End: 2025-07-26
Payer: MEDICARE

## 2025-07-26 DIAGNOSIS — H35.3211: ICD-10-CM

## 2025-07-26 PROCEDURE — 67028 INJECTION EYE DRUG: CPT | Mod: RT | Performed by: OPHTHALMOLOGY

## 2025-07-26 ASSESSMENT — KERATOMETRY
OD_K2POWER_DIOPTERS: 42.25
OS_AXISANGLE_DEGREES: 002
OS_K1POWER_DIOPTERS: 41.50
OD_K1POWER_DIOPTERS: 40.75
OS_K2POWER_DIOPTERS: 42.00
OD_AXISANGLE_DEGREES: 170

## 2025-07-26 ASSESSMENT — REFRACTION_AUTOREFRACTION
OS_SPHERE: -1.00
OS_AXIS: 114
OD_SPHERE: -0.75
OD_CYLINDER: 0.00
OS_CYLINDER: -0.75

## 2025-07-26 ASSESSMENT — VISUAL ACUITY
OD_BCVA: CF@1FT
OS_BCVA: 20/40-1

## 2025-07-29 ENCOUNTER — HOSPITAL ENCOUNTER (OUTPATIENT)
Dept: NON INVASIVE DIAGNOSTICS | Facility: HOSPITAL | Age: 75
Discharge: HOME | End: 2025-07-29
Attending: PHYSICIAN ASSISTANT
Payer: MEDICARE

## 2025-07-29 VITALS
DIASTOLIC BLOOD PRESSURE: 96 MMHG | BODY MASS INDEX: 36.84 KG/M2 | WEIGHT: 272 LBS | SYSTOLIC BLOOD PRESSURE: 154 MMHG | HEART RATE: 80 BPM | HEIGHT: 72 IN

## 2025-07-29 DIAGNOSIS — I77.810 THORACIC AORTIC ECTASIA (HCC): ICD-10-CM

## 2025-07-29 LAB
AORTIC ROOT: 3.7 CM
ASCENDING AORTA: 3.7 CM
BSA FOR ECHO PROCEDURE: 2.43 M2
E WAVE DECELERATION TIME: 228 MS
E/A RATIO: 0.74
FRACTIONAL SHORTENING: 33 (ref 28–44)
INTERVENTRICULAR SEPTUM IN DIASTOLE (PARASTERNAL SHORT AXIS VIEW): 1.2 CM
INTERVENTRICULAR SEPTUM: 1.2 CM (ref 0.6–1.1)
LAAS-AP2: 23.5 CM2
LAAS-AP4: 19.7 CM2
LEFT ATRIUM SIZE: 4.7 CM
LEFT ATRIUM VOLUME (MOD BIPLANE): 64 ML
LEFT ATRIUM VOLUME INDEX (MOD BIPLANE): 26.4 ML/M2
LEFT INTERNAL DIMENSION IN SYSTOLE: 3.9 CM (ref 2.1–4)
LEFT VENTRICULAR INTERNAL DIMENSION IN DIASTOLE: 5.8 CM (ref 3.5–6)
LEFT VENTRICULAR POSTERIOR WALL IN END DIASTOLE: 0.9 CM
LEFT VENTRICULAR STROKE VOLUME: 98 ML
LV EF US.2D.A4C+ESTIMATED: 54 %
LVSV (TEICH): 98 ML
MV E'TISSUE VEL-LAT: 14 CM/S
MV E'TISSUE VEL-SEP: 9 CM/S
MV PEAK A VEL: 0.61 M/S
MV PEAK E VEL: 45 CM/S
MV STENOSIS PRESSURE HALF TIME: 66 MS
MV VALVE AREA P 1/2 METHOD: 3.33
RIGHT ATRIUM AREA SYSTOLE A4C: 14 CM2
RIGHT VENTRICLE ID DIMENSION: 3.9 CM
SL CV LEFT ATRIUM LENGTH A2C: 5.8 CM
SL CV PED ECHO LEFT VENTRICLE DIASTOLIC VOLUME (MOD BIPLANE) 2D: 164 ML
SL CV PED ECHO LEFT VENTRICLE SYSTOLIC VOLUME (MOD BIPLANE) 2D: 66 ML
TR MAX PG: 18 MMHG
TR PEAK VELOCITY: 2.2 M/S
TRICUSPID ANNULAR PLANE SYSTOLIC EXCURSION: 2.7 CM
TRICUSPID VALVE PEAK REGURGITATION VELOCITY: 2.15 M/S

## 2025-07-29 PROCEDURE — 93306 TTE W/DOPPLER COMPLETE: CPT | Performed by: INTERNAL MEDICINE

## 2025-07-29 PROCEDURE — 93306 TTE W/DOPPLER COMPLETE: CPT

## 2025-08-14 ENCOUNTER — DOCTOR'S OFFICE (OUTPATIENT)
Dept: URBAN - NONMETROPOLITAN AREA CLINIC 1 | Facility: CLINIC | Age: 75
Setting detail: OPHTHALMOLOGY
End: 2025-08-14
Payer: MEDICARE

## 2025-08-14 DIAGNOSIS — H35.3124: ICD-10-CM

## 2025-08-14 DIAGNOSIS — H35.371: ICD-10-CM

## 2025-08-14 DIAGNOSIS — H35.3211: ICD-10-CM

## 2025-08-14 DIAGNOSIS — H35.372: ICD-10-CM

## 2025-08-14 DIAGNOSIS — H35.053: ICD-10-CM

## 2025-08-14 PROCEDURE — 99213 OFFICE O/P EST LOW 20 MIN: CPT | Performed by: OPHTHALMOLOGY

## 2025-08-14 PROCEDURE — 92134 CPTRZ OPH DX IMG PST SGM RTA: CPT | Performed by: OPHTHALMOLOGY

## 2025-08-14 ASSESSMENT — REFRACTION_AUTOREFRACTION
OS_CYLINDER: -0.75
OS_SPHERE: -1.00
OD_CYLINDER: 0.00
OD_SPHERE: -0.75
OS_AXIS: 114

## 2025-08-14 ASSESSMENT — LID POSITION - DERMATOCHALASIS
OS_DERMATOCHALASIS: LUL 1+
OD_DERMATOCHALASIS: RUL 1+

## 2025-08-14 ASSESSMENT — KERATOMETRY
OS_K2POWER_DIOPTERS: 42.00
OS_K1POWER_DIOPTERS: 41.50
OD_K1POWER_DIOPTERS: 40.75
OD_K2POWER_DIOPTERS: 42.25
OS_AXISANGLE_DEGREES: 002
OD_AXISANGLE_DEGREES: 170

## 2025-08-14 ASSESSMENT — VISUAL ACUITY
OS_BCVA: 20/40-1
OD_BCVA: CF@2FT

## 2025-08-14 ASSESSMENT — MACULA - RETINAL PIGMENT EPITHELIAL CHANGES: OD_RPE_CHANGES: 1+

## 2025-08-16 ENCOUNTER — DOCTOR'S OFFICE (OUTPATIENT)
Dept: URBAN - NONMETROPOLITAN AREA CLINIC 1 | Facility: CLINIC | Age: 75
Setting detail: OPHTHALMOLOGY
End: 2025-08-16
Payer: MEDICARE

## 2025-08-16 DIAGNOSIS — H35.3221: ICD-10-CM

## 2025-08-16 PROBLEM — H35.3211: Status: ACTIVE | Noted: 2025-08-16

## 2025-08-16 PROCEDURE — 67028 INJECTION EYE DRUG: CPT | Mod: LT | Performed by: OPHTHALMOLOGY

## 2025-08-16 ASSESSMENT — VISUAL ACUITY
OD_BCVA: CFX3'
OS_BCVA: 20/40-1

## 2025-08-16 ASSESSMENT — REFRACTION_AUTOREFRACTION
OS_CYLINDER: -0.75
OD_SPHERE: -0.75
OD_CYLINDER: 0.00
OS_AXIS: 114
OS_SPHERE: -1.00

## 2025-08-16 ASSESSMENT — KERATOMETRY
OD_K2POWER_DIOPTERS: 42.25
OS_AXISANGLE_DEGREES: 002
OD_AXISANGLE_DEGREES: 170
OS_K2POWER_DIOPTERS: 42.00
OS_K1POWER_DIOPTERS: 41.50
OD_K1POWER_DIOPTERS: 40.75